# Patient Record
Sex: FEMALE | Race: WHITE | NOT HISPANIC OR LATINO | URBAN - METROPOLITAN AREA
[De-identification: names, ages, dates, MRNs, and addresses within clinical notes are randomized per-mention and may not be internally consistent; named-entity substitution may affect disease eponyms.]

---

## 2023-04-26 ENCOUNTER — HOSPITAL ENCOUNTER (OUTPATIENT)
Dept: DATA CONVERSION | Facility: HOSPITAL | Age: 48
End: 2023-04-26
Attending: SURGERY | Admitting: SURGERY
Payer: COMMERCIAL

## 2023-04-26 DIAGNOSIS — D24.2 BENIGN NEOPLASM OF LEFT BREAST: ICD-10-CM

## 2023-04-26 DIAGNOSIS — Z87.891 PERSONAL HISTORY OF NICOTINE DEPENDENCE: ICD-10-CM

## 2023-04-26 DIAGNOSIS — N60.82 OTHER BENIGN MAMMARY DYSPLASIAS OF LEFT BREAST: ICD-10-CM

## 2023-04-26 DIAGNOSIS — N64.89 OTHER SPECIFIED DISORDERS OF BREAST: ICD-10-CM

## 2023-04-26 DIAGNOSIS — N60.81 OTHER BENIGN MAMMARY DYSPLASIAS OF RIGHT BREAST: ICD-10-CM

## 2023-04-26 DIAGNOSIS — R92.1 MAMMOGRAPHIC CALCIFICATION FOUND ON DIAGNOSTIC IMAGING OF BREAST: ICD-10-CM

## 2023-04-26 DIAGNOSIS — Z88.0 ALLERGY STATUS TO PENICILLIN: ICD-10-CM

## 2023-05-05 LAB
COMPLETE PATHOLOGY REPORT: NORMAL
CONVERTED CLINICAL DIAGNOSIS-HISTORY: NORMAL
CONVERTED FINAL DIAGNOSIS: NORMAL
CONVERTED FINAL REPORT PDF LINK TO COPY AND PASTE: NORMAL
CONVERTED GROSS DESCRIPTION: NORMAL

## 2023-09-07 VITALS — BODY MASS INDEX: 21.98 KG/M2 | HEIGHT: 64 IN | WEIGHT: 128.75 LBS

## 2023-10-02 NOTE — OP NOTE
PROCEDURE DETAILS    Preoperative Diagnosis:  Left breast atypical intraductal proliferation     Postoperative Diagnosis:  Left breast atypical intraductal proliferation     Surgeon: Allyson Hernandez  Resident/Fellow/Other Assistant: Antwon Chacon    Procedure:  1. Left Breast Magseed Localized Excisional Biopsy  2.  Left breast local tissue rearrangement    Anesthesia: Jayson Becerra  Estimated Blood Loss: 5 cc  Findings: left breast tissue with magseed and calcifications in tissue on xray  Specimens(s) Collected: yes,  left breast tissue   Patient Returned To/Condition: PACU/stable         Operative Report:   INDICATIONS FOR PROCEDURE:    Naheed Mcgee is a 48-year-old female who presented with screen detected calcifications in her left breast for which stereotactic core needle biopsy yielded atypical intraductal proliferation  with calcifications and focal necrosis; the latter was suggestive of a more significant adjacent lesion. We met in surgical consultation, and I personally reviewed her imaging and pathology.  I recommended left breast Magseed localized wide local excisional  biopsy. Following a detailed discussion regarding risks, benefits, and alternatives, informed consent was obtained, and we agreed to proceed. Prior to her procedure today she had a magnetic seed (Magseed) placed in the previously biopsied left breast  for localization purposes.  My personal review of her postprocedure films demonstrated the Magseed to be in good position relative to the residual calcifications. The biopsy clip had been dislodged at the time of core needle biopsy.      DESCRIPTION OF PROCEDURE:    The patient was brought to the operating room and positioned supine on the OR table.  Following patient identification and a time-out procedure, SCDs were applied, and antibiotics  were administered.  General anesthesia was initiated.  I utilized the Sentimag probe to confirm the placement of the Magseed within the left  breast.  The operative field was prepped and draped in a standard sterile fashion.     The calcifications were located in the lower outer quadrant at mid depth.  A curvilinear incision was chosen to hide the surgical scar in a cosmetic location. 1% lidocaine was injected  subcutaneously.  The skin was incised sharply.  Dissection was carried down through the skin and subcutaneous tissues. Soft tissue flaps were then raised in all directions: superior, inferior, medial, and lateral.  The lesion was located superior and  lateral to our incision; therefore, a tunneling technique was used.  I identified the anterior mastectomy plane between the subcutaneous tissues and the anterior breast parenchyma. I then developed a mastectomy flap widely up to and beyond the lesion.  The Sentimag probe was used to identify the Magseed in the area targeted for excision, and a marking stitch was placed within it for dissection purposes.  This area was then widely and circumferentially dissected from the surrounding tissues using electrocautery.  Care and attention were taken to include tissue superior and lateral to the Magseed to excise an additional small grouping of calcifications. The specimen was excised and labeled as left breast tissue.  It was marked with a double short stitch superior,  double long stitch lateral, and a single stitch anterior. It was placed on a grid and sent to [facility] for an x-ray which demonstrated the calcifications and Magseed within the specimen on x-ray.  I personally interpreted these images intraoperatively.   The specimen was then sent to Pathology for permanent section. I placed surgical clips at the posterior, superior, and lateral margins of the excision cavity. The excision cavity was copiously irrigated with warm sterile saline solution.  Hemostasis  was achieved.    The specimen measured 3.5 cm x 2.0 cm x 3.0 cm yielding a soft tissue defect of approximately 22 sq cm; therefore,  local tissue rearrangement was performed. Surrounding breast parenchyma was mobilized circumferentially from the anterior mastectomy plane  anteriorly and posteriorly from within the breast parenchyma. Once mobilized, the superior-medial and inferior-lateral pillars were advanced centrally and secured to each other with a 3-0 Vicryl suture in an interrupted fashion to close the defect. Hemostasis  was reconfirmed.  The more superficial layer of tissue that had been mobilized at the beginning of the procedure was similarly closed in order to create a cosmetic effect. 0.5% Marcaine was injected subcutaneously for analgesia.  The incision was then  closed in layers with an interrupted 3-0 Vicryl in the deep dermis followed by a running subcuticular 4-0 Monocryl for the skin.  Skin glue, fluffs, and a surgical bra were then applied.      The patient tolerated the procedure well.  There were no immediate complications.  All counts were correct.  Estimated blood loss was 5 cc.  I was present for and performed the entire procedure. The patient was then awakened and transported to the PACU  in stable condition.     Allyson Hernandez MD  Breast Surgical Oncology   pager [phone]    Note Recipients:   Susie Hernandez, DO - [id number] []                        Attestation:   Note Completion:  Attending Attestation I performed the procedure without a resident         Electronic Signatures:  Allyson Hernandez)  (Signed 26-Apr-2023 16:17)   Authored: Post-Operative Note, Chart Review, Note Completion      Last Updated: 26-Apr-2023 16:17 by Allyson Hernandez)

## 2023-10-05 ENCOUNTER — LAB (OUTPATIENT)
Dept: LAB | Facility: LAB | Age: 48
End: 2023-10-05
Payer: COMMERCIAL

## 2023-10-05 DIAGNOSIS — Z00.00 ENCOUNTER FOR GENERAL ADULT MEDICAL EXAMINATION WITHOUT ABNORMAL FINDINGS: Primary | ICD-10-CM

## 2023-10-05 LAB
ESTRADIOL SERPL-MCNC: <19 PG/ML
FSH SERPL-ACNC: 107.4 IU/L
LH SERPL-ACNC: 44.9 IU/L

## 2023-10-05 PROCEDURE — 83002 ASSAY OF GONADOTROPIN (LH): CPT

## 2023-10-05 PROCEDURE — 83001 ASSAY OF GONADOTROPIN (FSH): CPT

## 2023-10-05 PROCEDURE — 82670 ASSAY OF TOTAL ESTRADIOL: CPT

## 2023-10-05 PROCEDURE — 36415 COLL VENOUS BLD VENIPUNCTURE: CPT

## 2023-10-09 ENCOUNTER — TELEPHONE (OUTPATIENT)
Dept: SURGERY | Facility: HOSPITAL | Age: 48
End: 2023-10-09
Payer: COMMERCIAL

## 2023-10-09 DIAGNOSIS — Z12.39 BREAST CANCER SCREENING, HIGH RISK PATIENT: Primary | ICD-10-CM

## 2023-10-09 RX ORDER — TAMOXIFEN CITRATE 10 MG/1
10 TABLET ORAL DAILY
Qty: 90 TABLET | Refills: 3 | Status: SHIPPED | OUTPATIENT
Start: 2023-10-09 | End: 2023-12-19

## 2023-10-09 NOTE — TELEPHONE ENCOUNTER
Spoke with Marylu regarding lab results. Estradiol/FSH/LH demonstrates menopausal status. She has not had a cycle and discontinued her OCP 5 weeks ago. She is in agreement to start Tamoxifen 10mg daily for three years for risk reduction. Prescription sent to [facility]. Encouraged her to discuss Bupropion dosage with ordering provider to try and take the medications (Tamoxifen/Bupropion) 12 hours apart if possible. She is aware of risks and side effects with Tamoxifen. She does not need a form of contraception as she is menopausal. She will [facility] to [facility] in [facility] 2024 for annual mammogram and exam as previously recommended.

## 2023-10-16 DIAGNOSIS — Z12.39 BREAST CANCER SCREENING, HIGH RISK PATIENT: ICD-10-CM

## 2023-10-18 ENCOUNTER — APPOINTMENT (OUTPATIENT)
Dept: OBSTETRICS AND GYNECOLOGY | Facility: CLINIC | Age: 48
End: 2023-10-18
Payer: COMMERCIAL

## 2023-10-20 RX ORDER — BUPROPION HYDROCHLORIDE 150 MG/1
150 TABLET, EXTENDED RELEASE ORAL 2 TIMES DAILY
COMMUNITY
Start: 2023-08-01

## 2023-10-20 RX ORDER — NORGESTIMATE AND ETHINYL ESTRADIOL 7DAYSX3 28
1 KIT ORAL DAILY
COMMUNITY
Start: 2019-01-28 | End: 2024-01-25 | Stop reason: ALTCHOICE

## 2023-11-06 ENCOUNTER — DOCUMENTATION (OUTPATIENT)
Dept: SURGERY | Facility: HOSPITAL | Age: 48
End: 2023-11-06
Payer: COMMERCIAL

## 2023-11-06 NOTE — PROGRESS NOTES
Spoke with Naheed today. She started Tamoxifen 10mg daily for risk reduction on 10/29/2023. On Thursday 11/2/2023 she had what she felt like was a panic attack. She states she is feeling better now and it hasn't happened since. She does report daily hot flashes since starting Tamoxifen, but tolerable. We agreed to continue Tamoxifen and she will contact office if symptoms worsen.

## 2023-11-09 NOTE — PROGRESS NOTES
Naheed Mcgee female   1975 48 y.o.   [id number]      Chief Complaint  Right breast mass    History Of Present Illness  Naheed (''Marylu'') Everardo is a very pleasant 48 year old  female who is status post left breast Magseed localized excisional biopsy on 2023 for screen detected left breast atypical intraductal proliferation. Final pathology yielded atypical ductal hyperplasia (ADH) and atypical lobular hyperplasia (ALH), radial scar and PASH. She has a remote history of two right breast biopsies in 2019, benign. She has family history of breast cancer in her maternal cousin, 60. She has family history of ovarian cancer in her maternal grandmother, age 40. She presents today for a new right breast lump. She is here with her , Sulaiman. She first noticed it on 2023.      FEMALE HISTORY: menarche age 8, , first birth age 29, did not breastfeed, OCP's x 30 years (currently on OCP daily), premenopausal, LMP 3/2023, cycles becoming irregular, heterogeneously dense tissue     FAMILY CANCER HISTORY:  Maternal Cousin: Breast cancer, 60, BRCA 2 positive  Maternal Grandmother: Ovarian cancer, 40  Father: Bladder cancer, 60      Surgical History  She has a past surgical history that includes Other surgical history (2018);  section, classic (2019); and BI mammo guided breast left localization (Left, 3/30/2023).     Social History  She has no history on file for tobacco use, alcohol use, and drug use.    Family History  No family history on file.     Allergies  Patient has no allergy information on record.    Medications  Current Outpatient Medications   Medication Instructions    buPROPion SR (WELLBUTRIN SR) 150 mg, oral, 2 times daily    norgestimate-ethinyl estradioL (Ortho Tri-Cyclen,Trinessa) 0.18/0.215/0.25 mg-35 mcg (28) tablet 1 tablet, oral, Daily    tamoxifen (NOLVADEX) 10 mg, oral, Daily, Take with water or any other nonalcoholic drink with or without food at around  the same time(s) every day.         REVIEW OF SYSTEMS    Constitutional:  Negative for appetite change, fatigue, fever and unexpected weight change.   HENT:  Negative for ear pain, hearing loss, nosebleeds, sore throat and trouble swallowing.    Eyes:  Negative for discharge, itching and visual disturbance.   Respiratory:  Negative for cough, chest tightness and shortness of breath.    Cardiovascular:  Negative for chest pain, palpitations and leg swelling.   Breast: as indicated in HPI  Gastrointestinal:  Negative for abdominal pain, constipation, diarrhea and nausea.   Endocrine: Negative for cold intolerance and heat intolerance.   Genitourinary:  Negative for dysuria, frequency, hematuria, pelvic pain and vaginal bleeding.   Musculoskeletal:  Negative for arthralgias, back pain, gait problem, joint swelling and myalgias.   Skin:  Negative for color change and rash.   Allergic/Immunologic: Negative for environmental allergies and food allergies.   Neurological:  Negative for dizziness, tremors, speech difficulty, weakness, numbness and headaches.   Hematological:  Does not bruise/bleed easily.   Psychiatric/Behavioral:  Negative for agitation, dysphoric mood and sleep disturbance. The patient is not nervous/anxious.         Past Medical History  She has a past medical history of Encounter for screening mammogram for malignant neoplasm of breast (08/26/2020).     Physical Exam  Patient is alert and oriented x3 and in a relaxed and appropriate mood. Her gait is steady and hand grasps are equal. Sclera is clear. The breasts are small and nearly symmetrical. The tissue is dense without palpable abnormalities, discrete nodules or masses. The left breast has a well-healed excisional biopsy incision, inferior lateral quadrant. The skin and nipples appear normal. The right nipple is inverted. There is no cervical, supraclavicular or axillary lymphadenopathy. Heart rate and rhythm normal, S1 and S2 appreciated. The lungs  are clear to auscultation bilaterally. Abdomen is soft and non-tender.       Physical Exam     Last Recorded Vitals  There were no vitals filed for this visit.      Imaging  MR breast bilateral w contrast full protocol 07/12/2023    Narrative  Interpreted By:  NEHA MOSQUEDA MD  MRN: [id number]  Patient Name: TERRY SEGOVIA    STUDY:  MRI BREAST BILATERAL WITH CONTRAST FULL PROTOCOL;  7/12/2023 1:10 pm    ACCESSION NUMBER(S):  [id number]    ORDERING CLINICIAN:  KIM MCLAIN    INDICATION:  High risk screening. Dense breast tissue. Left breast excisional  biopsy of atypia.    COMPARISON:  Mammogram 03/20/2023    TECHNIQUE:  Using a dedicated breast coil, STIR axial and T1-weighted fat  saturation axial images of the breasts were obtained, the latter both  before and after intravenous administration of Gadolinium DTPA. On an  independent workstation, 3-D images were formulated using [employer]  including time enhancement curves, subtraction images and MIP images.    Intravenous contrast:  12 milliliter of DOTAREM GADOTERATE MEGLUMINE  INJECTION    FINDINGS:  There is  symmetric  marked bilateral background enhancement,  limiting the evaluation.    There is extreme fibroglandular tissue. Bilateral cysts are present.    RIGHT BREAST:  No suspicious mass or nonmass enhancement is identified.    No axillary or internal mammary lymphadenopathy is appreciated.    LEFT BREAST:  No suspicious mass or nonmass enhancement is identified. Minimal  postsurgical changes are seen in the inferior central to lateral  aspect at posterior depth.    No axillary or internal mammary lymphadenopathy is appreciated.    NON-BREAST FINDINGS:  None. Please note that the evaluation of the cervical, thoracic and  abdominal structures is limited on the dedicated breast MRI  examination.    Impression  No MRI evidence of malignancy in either breast.    BI-RADS CATEGORY:    Category: 2 - Benign.  Recommendation: 1 Year Screening.        Assessment/Plan   High risk surveillance care, normal clinical exam, history of left ADH/ALH, history of right core biopsy, benign, family history of breast and ovarian cancer, heterogeneously dense tissue     Plan: Return in January 2024 for bilateral screening mammogram and office visit. Continue Tamoxifen 10 mg daily.     Patient Discussion/Summary  Your clinical examination and imaging are normal. Please return in January 2024 for bilateral screening mammogram and office visit or sooner if you have any problems or concerns.     You can see your health information, review clinical summaries from office visits & test results online when you follow your health with MY  Chart, a personal health record. To sign up go to [email or web address]. If you need assistance with signing up or trouble getting into your account call [email or web address] Patient Line 24/7 at [phone].    My office phone number is [phone] if you need to get in touch with me or have additional questions or concerns. Thank you for choosing [facility] and trusting me as your healthcare provider. I look forward to seeing you again at your next office visit. I am honored to be a provider on your health care team and I remain dedicated to helping you achieve your health goals.      Rylee Cary, APRN-CNP

## 2023-11-14 ENCOUNTER — APPOINTMENT (OUTPATIENT)
Dept: SURGICAL ONCOLOGY | Facility: HOSPITAL | Age: 48
End: 2023-11-14
Payer: COMMERCIAL

## 2023-11-14 PROBLEM — R92.8 ABNORMAL MAMMOGRAM OF RIGHT BREAST: Status: ACTIVE | Noted: 2023-11-14

## 2023-11-14 PROBLEM — Z79.810 USE OF TAMOXIFEN (NOLVADEX): Status: ACTIVE | Noted: 2023-11-14

## 2023-11-14 PROBLEM — R35.0 URINARY FREQUENCY: Status: ACTIVE | Noted: 2023-11-14

## 2023-11-14 PROBLEM — N63.10 MASS OF RIGHT BREAST: Status: ACTIVE | Noted: 2023-11-14

## 2023-11-14 PROBLEM — M62.89 PELVIC FLOOR DYSFUNCTION: Status: ACTIVE | Noted: 2023-11-14

## 2023-11-14 PROBLEM — N32.81 OAB (OVERACTIVE BLADDER): Status: ACTIVE | Noted: 2023-08-01

## 2023-11-14 PROBLEM — F43.23 SITUATIONAL MIXED ANXIETY AND DEPRESSIVE DISORDER: Status: ACTIVE | Noted: 2023-08-01

## 2023-11-14 PROBLEM — Z12.39 BREAST CANCER SCREENING, HIGH RISK PATIENT: Status: ACTIVE | Noted: 2023-11-14

## 2023-11-14 PROBLEM — R92.1 BREAST CALCIFICATIONS ON MAMMOGRAM: Status: ACTIVE | Noted: 2023-11-14

## 2023-11-14 RX ORDER — NITROFURANTOIN 25; 75 MG/1; MG/1
100 CAPSULE ORAL EVERY 12 HOURS
COMMUNITY
Start: 2023-06-26 | End: 2023-12-05 | Stop reason: SDUPTHER

## 2023-11-14 NOTE — PROGRESS NOTES
Naheed Mcgee female   1975 48 y.o.   [id number]     Chief Complaint  Follow up right breast mass.     History Of Present Illness  Naheed (''Marylu'') Everardo is a very pleasant 48 year old  female who is status post left breast Magseed localized excisional biopsy on 2023 for screen detected left breast atypical intraductal proliferation. Final pathology yielded atypical ductal hyperplasia (ADH) and atypical lobular hyperplasia (ALH), radial scar and PASH. She has a remote history of two right breast biopsies in 2019, benign. She has family history of breast cancer in her maternal cousin, 60. She has family history of ovarian cancer in her maternal grandmother, age 40. She met with genetics and was BRCA negative. She started Tamoxifen 10mg daily for risk reduction on 10/29/2023. On 2023 she had what she felt like was a panic attack. She states she is feeling better now and it hasn't happened since. She does report daily hot flashes since starting Tamoxifen, but tolerable. She was agreeable to continue Tamoxifen. She presents today for a new right breast mass. She first noticed the lump. She denies trauma to the breast.     11/15/2023 Right diagnostic mammogram with ultrasound, indicates BI-RADS Category 2.      REPRODUCTIVE HISTORY: menarche age 8, , first birth age 29, did not breastfeed, OCP's x 30 years (currently on OCP daily), premenopausal, LMP cycles becoming irregular, heterogeneously dense tissue     FAMILY CANCER HISTORY:  Maternal Cousin: Breast cancer, 60, BRCA 2 positive  Maternal Grandmother: Ovarian cancer, 40  Father: Bladder cancer, 60      Review of Systems  Constitutional:  Negative for appetite change, fatigue, fever and unexpected weight change.   HENT:  Negative for ear pain, hearing loss, nosebleeds, sore throat and trouble swallowing.    Eyes:  Negative for discharge, itching and visual disturbance.   Breast: As stated in HPI.  Respiratory:  Negative for  cough, chest tightness and shortness of breath.    Cardiovascular:  Negative for chest pain, palpitations and leg swelling.   Gastrointestinal:  Negative for abdominal pain, constipation, diarrhea and nausea.   Endocrine: Negative for cold intolerance and heat intolerance.   Genitourinary:  Negative for dysuria, frequency, hematuria, pelvic pain and vaginal bleeding.   Musculoskeletal:  Negative for arthralgias, back pain, gait problem, joint swelling and myalgias.   Skin:  Negative for color change and rash.   Allergic/Immunologic: Negative for environmental allergies and food allergies.   Neurological:  Negative for dizziness, tremors, speech difficulty, weakness, numbness and headaches.   Hematological:  Does not bruise/bleed easily.   Psychiatric/Behavioral:  Negative for agitation, dysphoric mood and sleep disturbance. The patient is not nervous/anxious.       Past Medical History  She has a past medical history of Encounter for screening mammogram for malignant neoplasm of breast (2020).     Surgical History  She has a past surgical history that includes Other surgical history (2018);  section, classic (2019); and BI mammo guided breast left localization (Left, 3/30/2023).     Family History  Cancer-related family history is not on file.     Social History     Tobacco Use    Smoking status: Former     Years: 5     Types: Cigarettes     Quit date:      Years since quittin.8    Smokeless tobacco: Never   Substance Use Topics    Alcohol use: Not Currently         Allergies  Allergies   Allergen Reactions    Penicillins Hives        Medications       Current Outpatient Medications   Medication Instructions    buPROPion SR (WELLBUTRIN SR) 150 mg, oral, 2 times daily    norgestimate-ethinyl estradioL (Ortho Tri-Cyclen,Trinessa) 0.18/0.215/0.25 mg-35 mcg (28) tablet 1 tablet, oral, Daily    tamoxifen (NOLVADEX) 10 mg, oral, Daily, Take with water or any other nonalcoholic drink with  "or without food at around the same time(s) every day.         Last Recorded Vitals  Blood pressure 113/79, pulse 59, height 1.626 m (5' 4\"), weight 62.7 kg (138 lb 3.2 oz).       Physical Exam  Chest:          Patient is alert and oriented x3 and in a relaxed and appropriate mood. Her gait is steady and hand grasps are equal. Sclera is clear. The breasts are small and nearly symmetrical. Right breast 7:00, 3 cm from the nipple is a 1 x 1 cm mass, soft, round, and mobile. The left breast tissue is dense without palpable abnormalities, discrete nodules or masses. The left breast has a well-healed excisional biopsy incision, inferior lateral quadrant. The skin and nipples appear normal. The right nipple is inverted. There is no cervical, supraclavicular or axillary lymphadenopathy. Heart rate and rhythm normal, S1 and S2 appreciated. The lungs are clear to auscultation bilaterally. Abdomen is soft and non-tender.        Relevant Results and Imaging  Study Result    Narrative & Impression   Interpreted By:  Topher Hicks,   STUDY:  BI MAMMO RIGHT DIAGNOSTIC TOMOSYNTHESIS; BI US BREAST LIMITED RIGHT;  11/15/2023 12:20 pm; 11/15/2023 12:47 pm      ACCESSION NUMBER(S):  [id number]; [id number]      ORDERING CLINICIAN:  KIM MCLAIN      INDICATION:  Diagnostic evaluation of palpable area of clinical concern in the  right breast. History of left breast focal atypical ductal  hyperplasia, ALH associated with radial scar status post excisional  biopsy (April 2023).      COMPARISON:  02/10/2023, 01/23/2023, breast MRI 07/12/2023 and all prior breast  imaging exams available in our system at the time of dictation.      FINDINGS:  MAMMOGRAPHY: 2D and tomosynthesis images were reviewed at 1 mm slice  thickness. Additional spot magnification views were obtained.      Density:  The breast tissue is heterogeneously dense, which may  obscure small masses.      A radiopaque marker was placed by the patient on the skin at the " site  of the palpable clinical concern in the central lower right breast at  anterior depth. There is an oval low-density mass with circumscribed  margins underneath the BB marker in the right breast.      Calcifications in subareolar region of the right breast associated  with a biopsy clip demonstrate layering on magnification views  consistent with benign changes. No suspicious masses or  calcifications are identified. There is an additional biopsy tissue  marker in the central outer right breast at anterior depth.      ULTRASOUND: Targeted ultrasound with elastography performed by a  registered sonographer in the area of palpable clinical concern in  the right breast at 7 o'clock, 3 cm from the nipple demonstrates a  complicated cyst with debris measuring 0.7 x 0.5 x 0.6 cm. This  finding is avascular and soft on elastography. No suspicious  sonographic abnormality is identified.      IMPRESSION:  1. Benign cyst in the right breast corresponds to the palpable area  of clinical concern. Clinical follow-up is recommended.  2. No mammographic or targeted sonographic evidence of malignancy in  the right breast. Patient will be due for annual bilateral screening  mammography in February 2024.      BI-RADS CATEGORY:      BI-RADS Category:  2 Benign.  Recommendation:  Return to Annual Screening Mammogram.  Recommended Date:  N/A.  Laterality:  Bilateral.      For any future breast imaging appointments, please call [phone]  ([phone]).          MACRO:  None      Signed by: Topher Hicks 11/15/2023 3:48 PM        Time was spent viewing digital images. I explained the results in depth, along with suggested explanation for follow up recommendations based on the testing results. BI-RADS Category 2        Visit Diagnosis  1. Mass of right breast, unspecified quadrant        2. Use of tamoxifen (Nolvadex)        3. Breast cancer screening, high risk patient             Assessment/Plan  High risk surveillance care, right  breast mass, history of left ADH/ALH, history of right core biopsy, benign, BRCA negative, on Tamoxifen, family history of breast and ovarian cancer, heterogeneously dense tissue     Plan:  Return in January 2024 for bilateral screening mammogram and office visit.      Patient Discussion/Summary  Return in January 2024 for bilateral screening mammogram and office visit.      You can see your health information, review clinical summaries from office visits & test results online when you follow your health with MY  Chart, a personal health record. To sign up go to [email or web address]. If you need assistance with signing up or trouble getting into your account call [employer] Patient Line 24/7 at [phone].     My office phone number is [phone] if you need to get in touch with me or have additional questions or concerns. Thank you for choosing [facility] and trusting me as your healthcare provider. I look forward to seeing you again at your next office visit. I am honored to be a provider on your health care team and I remain dedicated to helping you achieve your health goals.     Bette España, APRN-CNP

## 2023-11-15 ENCOUNTER — OFFICE VISIT (OUTPATIENT)
Dept: SURGICAL ONCOLOGY | Facility: CLINIC | Age: 48
End: 2023-11-15
Payer: COMMERCIAL

## 2023-11-15 ENCOUNTER — ANCILLARY PROCEDURE (OUTPATIENT)
Dept: RADIOLOGY | Facility: CLINIC | Age: 48
End: 2023-11-15
Payer: COMMERCIAL

## 2023-11-15 VITALS
WEIGHT: 138.2 LBS | BODY MASS INDEX: 23.6 KG/M2 | SYSTOLIC BLOOD PRESSURE: 113 MMHG | HEIGHT: 64 IN | DIASTOLIC BLOOD PRESSURE: 79 MMHG | HEART RATE: 59 BPM

## 2023-11-15 DIAGNOSIS — N63.0 BREAST LUMP IN FEMALE: ICD-10-CM

## 2023-11-15 DIAGNOSIS — N63.10 MASS OF RIGHT BREAST, UNSPECIFIED QUADRANT: Primary | ICD-10-CM

## 2023-11-15 DIAGNOSIS — Z12.39 BREAST CANCER SCREENING, HIGH RISK PATIENT: ICD-10-CM

## 2023-11-15 DIAGNOSIS — Z79.810 USE OF TAMOXIFEN (NOLVADEX): ICD-10-CM

## 2023-11-15 PROCEDURE — 76642 ULTRASOUND BREAST LIMITED: CPT | Mod: RIGHT SIDE | Performed by: STUDENT IN AN ORGANIZED HEALTH CARE EDUCATION/TRAINING PROGRAM

## 2023-11-15 PROCEDURE — 99213 OFFICE O/P EST LOW 20 MIN: CPT | Mod: 25 | Performed by: NURSE PRACTITIONER

## 2023-11-15 PROCEDURE — 77061 BREAST TOMOSYNTHESIS UNI: CPT | Mod: RT

## 2023-11-15 PROCEDURE — 99213 OFFICE O/P EST LOW 20 MIN: CPT | Performed by: NURSE PRACTITIONER

## 2023-11-15 PROCEDURE — 77065 DX MAMMO INCL CAD UNI: CPT | Mod: RIGHT SIDE | Performed by: STUDENT IN AN ORGANIZED HEALTH CARE EDUCATION/TRAINING PROGRAM

## 2023-11-15 PROCEDURE — 77061 BREAST TOMOSYNTHESIS UNI: CPT | Mod: RIGHT SIDE | Performed by: STUDENT IN AN ORGANIZED HEALTH CARE EDUCATION/TRAINING PROGRAM

## 2023-11-15 PROCEDURE — 76642 ULTRASOUND BREAST LIMITED: CPT | Mod: RT

## 2023-11-15 ASSESSMENT — PAIN SCALES - GENERAL: PAINLEVEL: 0-NO PAIN

## 2023-12-05 ENCOUNTER — PROCEDURE VISIT (OUTPATIENT)
Dept: UROLOGY | Facility: CLINIC | Age: 48
End: 2023-12-05
Payer: COMMERCIAL

## 2023-12-05 DIAGNOSIS — R39.15 URGENCY OF URINATION: Primary | ICD-10-CM

## 2023-12-05 DIAGNOSIS — M62.89 HIGH-TONE PELVIC FLOOR DYSFUNCTION: ICD-10-CM

## 2023-12-05 DIAGNOSIS — R35.0 URINARY FREQUENCY: ICD-10-CM

## 2023-12-05 LAB
POC APPEARANCE, URINE: CLEAR
POC BILIRUBIN, URINE: NEGATIVE
POC BLOOD, URINE: ABNORMAL
POC COLOR, URINE: YELLOW
POC GLUCOSE, URINE: NEGATIVE MG/DL
POC KETONES, URINE: NEGATIVE MG/DL
POC LEUKOCYTES, URINE: NEGATIVE
POC NITRITE,URINE: NEGATIVE
POC PH, URINE: 7 PH
POC PROTEIN, URINE: NEGATIVE MG/DL
POC SPECIFIC GRAVITY, URINE: 1.02
POC UROBILINOGEN, URINE: 0.2 EU/DL

## 2023-12-05 PROCEDURE — 2500000004 HC RX 250 GENERAL PHARMACY W/ HCPCS (ALT 636 FOR OP/ED): Mod: JZ | Performed by: OBSTETRICS & GYNECOLOGY

## 2023-12-05 PROCEDURE — 99213 OFFICE O/P EST LOW 20 MIN: CPT | Performed by: OBSTETRICS & GYNECOLOGY

## 2023-12-05 PROCEDURE — 52287 CYSTOSCOPY CHEMODENERVATION: CPT | Performed by: OBSTETRICS & GYNECOLOGY

## 2023-12-05 RX ORDER — NITROFURANTOIN 25; 75 MG/1; MG/1
100 CAPSULE ORAL EVERY 12 HOURS
Qty: 6 CAPSULE | Refills: 0 | Status: SHIPPED | OUTPATIENT
Start: 2023-12-05 | End: 2023-12-06 | Stop reason: SDUPTHER

## 2023-12-05 RX ORDER — NITROFURANTOIN 25; 75 MG/1; MG/1
100 CAPSULE ORAL ONCE
Status: COMPLETED | OUTPATIENT
Start: 2023-12-05 | End: 2023-12-05

## 2023-12-05 RX ADMIN — ONABOTULINUMTOXINA 100 UNITS: 100 INJECTION, POWDER, LYOPHILIZED, FOR SOLUTION INTRAMUSCULAR at 15:31

## 2023-12-05 RX ADMIN — NITROFURANTOIN (MONOHYDRATE/MACROCRYSTALS) 100 MG: 75; 25 CAPSULE ORAL at 15:31

## 2023-12-05 NOTE — PATIENT INSTRUCTIONS
Please  your 3-day course of Macrobid therapy at your local pharmacy.    Please follow-up in 5 to 6 months for repeat 100 units Botox.    Please contact the clinic with any questions or concerns.    [phone]

## 2023-12-05 NOTE — PROGRESS NOTES
Subjective   Patient ID: Naheed Mcgee is a 48 y.o. female who presents for intradetrusor Botox.  HPI  48-year-old with urinary urgency and frequency and high tone pelvic floor weakness having undergone 100 units of Botox 06/26/2023 presenting for repeat 100 units Botox.     She has noted worsening urinary urgency and frequency complaints and believes it is time for repeat 100 units Botox.    She has no other complaints.     From Previous note     48-year-old with urinary urgency and frequency and high tone pelvic floor weakness presenting for 100 units of Botox 06/26/2023.     She reports her Botox injections have helped her greatly. She is happy with her treatment. She notes she felt pressure/cramping 2 days after her injection but has since resolved.     She has no other complaints.      From previous note  48-year-old with urinary urgency and frequency and high tone pelvic floor weakness presenting for 100 units of Botox today 06/26/2023.      She has no other complaints.      From previous note  48- year-old patient presenting as a self referral with complaints of urinary urgency, frequency and incontinence complaints.     The patient presents with complaints or urinary frequency and urgency. She 2-3 notes episodes of nocturia but denies any enuresis. She voids 1-2 times every hour during the day. She has previously tried timed voiding and pelvic floor Physical therapy with no benefits. She has utilized Ditropan in the past with no benefits. She was also prescribed Myrbetriq and Gemtesa which have not benefited her lower urinary tract complaints. She denies leaking on laughing, cough or sneezing. She denies any history of nephrolithiasis, gross hematuria or chronic recurrent UTIs.      She underwent urodynamic testing 9/18/2020 demonstrating a hypersensitive bladder with urgency and no other concerns.     She is sexually active but denies any vaginal complaints, no abnormal vaginal bleeding or discharge. She  denies any vaginal dryness or irritation. She denies any bulge complaints, no pressure or pulling.     She denies any bowel related complaints, no fecal or flatal incontinence.     She has no other complaints.          Review of Systems  Constitutional: No fever, No chills and No fatigue.   Eyes: No vision problems and No dryness of the eyes.   ENT: No dry mouth, No hearing loss and No nosebleeds.   Cardiovascular: No chest pain, No palpitations and No orthopnea.   Respiratory: No shortness of breath, No cough and No wheezing.   Gastrointestinal: No abdominal pain, No constipation, No nausea, No diarrhea, No vomiting and No melena.   Genitourinary: As noted in HPI.   Musculoskeletal: No back pain, No myalgias, No muscle weakness, No joint swelling and No leg edema.   Integumentary: No rashes, No skin lesion and No itching.   Neurological: No headache, No numbness and No dizziness.   Psychiatric: No sleep disturbances, No anxiety and No depression.   Endocrine: No hot flashes, No loss of hair and No hirsutism.   Hematologic/Lymphatic: No swollen glands, No tendency for easy bleeding and No tendency for easy bruising.   All other systems have been reviewed and are negative for complaint.        Objective   Physical Exam    PHYSICAL EXAMINATION:  No LMP recorded. Patient is perimenopausal.  There is no height or weight on file to calculate BMI.  There were no vitals taken for this visit.  General Appearance: well appearing  Neuro: Alert and oriented   HEENT: mucous membranes moist, neck supple  Resp: No respiratory distress, normal work of breathing  MSK: normal range of motion, gait appropriate      After consent was signed and placed in the chart the patient was prepped with iodine and lidocaine.  A flexible cystoscope was then introduced into the bladder with normal-appearing bladder mucosa and ureteral orifices in the normal orthotopic position.  She was then injected with in 5 locations with an Olympus needle to  a depth of 4 mm with 2 cc aliquots of a mixture of 100 units Botox mixed into 10 cc of preservative-free saline.  The bladder was then drained.  There was no bleeding.  The patient received 100 mg of Macrobid at the completion of the case.  She tolerated the procedure well.    Assessment/Plan   48-year-old with urinary urgency and frequency and high tone pelvic floor weakness having undergone 100 units of Botox 06/26/2023 and today 12/5/2023.     #1 uncomplicated 100 units Botox today 12/5/2023.  The patient has previously utilized Ditropan as well as Myrbetriq and Gemtesa with no significant benefits to her lower urinary tract symptoms. Urodynamics performed in September 2020 demonstrated hypersensitivity with no other bladder abnormalities and no genuine stress urinary incontinence. We have previously discussed at length her third line therapeutic options including PTNS, Botox, and InterStim. She has had excellent benefits with her 100 units Botox and we will continue this moving forward.  The patient will receive a 3-day course of Macrobid therapy now.     2. The patient was noted to have a high tone but weak pelvic floor. We will readdress the need for pelvic floor physical therapy follow-up visits.     3. The patient will follow-up for repeat 100 units Botox in April 2024.     JASON Kong MD   #1 the patient has previously utilized Ditropan as well as Myrbetriq and Gemtesa with no significant benefits to her lower urinary tract symptoms. Urodynamics performed in September 2020 demonstrated hypersensitivity with no other bladder abnormalities and no genuine stress urinary incontinence. We have previously discussed at length her third line therapeutic options including PTNS, Botox, and InterStim. She has had excellent benefits with her 100 units Botox and we will continue this moving forward.     2. The patient was noted to have a high tone but weak pelvic floor. We will readdress the need for pelvic  floor physical therapy follow-up visits.     3. The patient will follow-up for repeat 100 units Botox when she feels her urgency and frequency complaints return. She will contact the clinic when she is ready for this.     JASON Kong MD     Scribe Attestation  By signing my name below, IDeneen Scribe attest that this documentation has been prepared under the direction and in the presence of Nishant Kong MD. All medical record entries made by the Scribe were at my direction or personally dictated by me. I have reviewed the chart and agree that the record accurately reflects my personal performance of the history, physical exam, discussion and plan.

## 2023-12-06 DIAGNOSIS — R39.15 URGENCY OF URINATION: ICD-10-CM

## 2023-12-06 DIAGNOSIS — R35.0 URINARY FREQUENCY: ICD-10-CM

## 2023-12-06 RX ORDER — NITROFURANTOIN 25; 75 MG/1; MG/1
100 CAPSULE ORAL EVERY 12 HOURS
Qty: 6 CAPSULE | Refills: 0 | Status: SHIPPED | OUTPATIENT
Start: 2023-12-06 | End: 2023-12-09

## 2023-12-07 RX ORDER — DOXYCYCLINE HYCLATE 100 MG
TABLET ORAL
COMMUNITY
Start: 2023-10-20 | End: 2024-04-24 | Stop reason: WASHOUT

## 2023-12-19 RX ORDER — TAMOXIFEN CITRATE 10 MG/1
TABLET ORAL
Qty: 90 TABLET | Refills: 3 | Status: SHIPPED | OUTPATIENT
Start: 2023-12-19

## 2023-12-28 ENCOUNTER — APPOINTMENT (OUTPATIENT)
Dept: RADIOLOGY | Facility: CLINIC | Age: 48
End: 2023-12-28
Payer: COMMERCIAL

## 2023-12-28 ENCOUNTER — APPOINTMENT (OUTPATIENT)
Dept: SURGICAL ONCOLOGY | Facility: CLINIC | Age: 48
End: 2023-12-28
Payer: COMMERCIAL

## 2024-01-16 NOTE — PROGRESS NOTES
Naheed Mcgee female   1975 49 y.o.   [id number]      Chief Complaint  High risk surveillance care, annual mammogram and exam    History Of Present Illness  Naheed (''Marylu'') Everardo is a very pleasant 49 year old  female who is status post left breast Magseed localized excisional biopsy on 2023 for screen detected left breast atypical intraductal proliferation. Final pathology yielded atypical ductal hyperplasia (ADH) and atypical lobular hyperplasia (ALH), radial scar and PASH. She has a remote history of two right breast biopsies in 2019, benign. She has family history of breast cancer in her maternal cousin, 60. She has family history of ovarian cancer in her maternal grandmother, age 40. She started Tamoxifen 10mg daily in 2023, currently taking and tolerating well. She presents today for annual mammogram and exam. She denies any new masses or lumps.     BREAST IMAGIN2023 Bilateral Full MRI, indicates BI-RADS Category 2.      FEMALE HISTORY: menarche age 8, , first birth age 29, did not breastfeed, OCP's x 30 years natural menopause age 48, heterogeneously dense tissue     FAMILY CANCER HISTORY:  Maternal Cousin: Breast cancer, 60, BRCA 2 positive  Maternal Grandmother: Ovarian cancer, 40  Father: Bladder cancer, 60     Surgical History  She has a past surgical history that includes Other surgical history (2018);  section, classic (2019); BI mammo guided breast left localization (Left, 2023); and Breast biopsy (Bilateral).     Social History  She reports that she quit smoking about 12 months ago. Her smoking use included cigarettes. She has never used smokeless tobacco. She reports that she does not currently use alcohol. She reports that she does not use drugs.    Family History  Family History   Problem Relation Name Age of Onset    Ovarian cancer Maternal Grandmother      Breast cancer Cousin  60        maternal 1st cousin         Allergies  Penicillins    Medications  Current Outpatient Medications   Medication Instructions    buPROPion SR (WELLBUTRIN SR) 150 mg, oral, 2 times daily    doxycycline (Vibra-Tabs) 100 mg tablet TAKE 1 TABLET (100 MG TOTAL) BY MOUTH TWICE A DAY FOR 5 DAYS NOT FOR PREGNANCY OR LACTATION    tamoxifen (Nolvadex) 10 mg tablet FOR DIRECTIONS ON HOW TO   TAKE THIS MEDICATION, READ THE [employer] MAIL SERVICE  INVOICE/RECEIPT.         REVIEW OF SYSTEMS    Constitutional:  Negative for appetite change, fatigue, fever and unexpected weight change.   HENT:  Negative for ear pain, hearing loss, nosebleeds, sore throat and trouble swallowing.    Eyes:  Negative for discharge, itching and visual disturbance.   Respiratory:  Negative for cough, chest tightness and shortness of breath.    Cardiovascular:  Negative for chest pain, palpitations and leg swelling.   Breast: as indicated in HPI  Gastrointestinal:  Negative for abdominal pain, constipation, diarrhea and nausea.   Endocrine: Negative for cold intolerance and heat intolerance.   Genitourinary:  Negative for dysuria, frequency, hematuria, pelvic pain and vaginal bleeding.   Musculoskeletal:  Negative for arthralgias, back pain, gait problem, joint swelling and myalgias.   Skin:  Negative for color change and rash.   Allergic/Immunologic: Negative for environmental allergies and food allergies.   Neurological:  Negative for dizziness, tremors, speech difficulty, weakness, numbness and headaches.   Hematological:  Does not bruise/bleed easily.   Psychiatric/Behavioral:  Negative for agitation, dysphoric mood and sleep disturbance. The patient is not nervous/anxious.         Past Medical History  She has a past medical history of Encounter for screening mammogram for malignant neoplasm of breast (08/26/2020).     Physical Exam  Patient is alert and oriented x3 and in a relaxed and appropriate mood. Her gait is steady and hand grasps are equal. Sclera is clear. The breasts  are small and nearly symmetrical. The tissue is dense without palpable abnormalities, discrete nodules or masses. The left breast has a well-healed excisional biopsy incision, inferior lateral quadrant with palpable 1.5 cm soft oil cyst at 4:00. The skin and nipples appear normal. The right nipple is inverted. There is no cervical, supraclavicular or axillary lymphadenopathy. Heart rate and rhythm normal, S1 and S2 appreciated. The lungs are clear to auscultation bilaterally. Abdomen is soft and non-tender.       Physical Exam  Chest:              Last Recorded Vitals  Vitals:    01/25/24 1416   BP: 109/75   Pulse: 57       Relevant Results   Time was spent viewing digital images of the radiology testing with the patient. I explained the results in depth, along with suggested explanation for follow up recommendations based on the testing results. BI-RADS Category 2    Imaging  Narrative & Impression   Interpreted By:  Katie Hernandez,   STUDY:  BI MAMMO BILATERAL SCREENING TOMOSYNTHESIS;  1/25/2024 1:56 pm      ACCESSION NUMBER(S):  [id number]      ORDERING CLINICIAN:  KIM MCLAIN      INDICATION:  Screening. History of left breast lumpectomy. Benign right breast  biopsy.      COMPARISON:  01/23/2023, 01/11/2022.      FINDINGS:  2D and tomosynthesis images were reviewed at 1 mm slice thickness.      Density:  The breast tissue is heterogeneously dense, which may  obscure small masses.      Postoperative scarring and surgical clips are seen in the central  lateral left breast at mid to posterior depth. Stable benign  calcifications are seen in the central slightly inferior right  breast. No suspicious masses or calcifications are identified.      IMPRESSION:  No mammographic evidence of malignancy. Posttreatment changes of the  left breast.      BI-RADS CATEGORY:      BI-RADS Category:  2 Benign.  Recommendation:  Routine Screening Mammogram in 1 Year.  Recommended Date:  1 Year.  Laterality:  Bilateral.        MR breast bilateral w contrast full protocol 07/12/2023    Narrative  Interpreted By:  NEHA MOSQUEDA MD  MRN: [id number]  Patient Name: TERRY SEGOVIA    STUDY:  MRI BREAST BILATERAL WITH CONTRAST FULL PROTOCOL;  7/12/2023 1:10 pm    ACCESSION NUMBER(S):  [id number]    ORDERING CLINICIAN:  KIM MCLAIN    INDICATION:  High risk screening. Dense breast tissue. Left breast excisional  biopsy of atypia.    COMPARISON:  Mammogram 03/20/2023    TECHNIQUE:  Using a dedicated breast coil, STIR axial and T1-weighted fat  saturation axial images of the breasts were obtained, the latter both  before and after intravenous administration of Gadolinium DTPA. On an  independent workstation, 3-D images were formulated using In[employer]  including time enhancement curves, subtraction images and MIP images.    Intravenous contrast:  12 milliliter of DOTAREM GADOTERATE MEGLUMINE  INJECTION    FINDINGS:  There is  symmetric  marked bilateral background enhancement,  limiting the evaluation.    There is extreme fibroglandular tissue. Bilateral cysts are present.    RIGHT BREAST:  No suspicious mass or nonmass enhancement is identified.    No axillary or internal mammary lymphadenopathy is appreciated.    LEFT BREAST:  No suspicious mass or nonmass enhancement is identified. Minimal  postsurgical changes are seen in the inferior central to lateral  aspect at posterior depth.    No axillary or internal mammary lymphadenopathy is appreciated.    NON-BREAST FINDINGS:  None. Please note that the evaluation of the cervical, thoracic and  abdominal structures is limited on the dedicated breast MRI  examination.    Impression  No MRI evidence of malignancy in either breast.    BI-RADS CATEGORY:    Category: 2 - Benign.  Recommendation: 1 Year Screening.       Assessment/Plan   High risk surveillance care, normal clinical exam and imaging, history of left ADH/ALH, history of right core biopsy, benign, family history of breast and  ovarian cancer, heterogeneously dense tissue     Plan: Return in one year for bilateral screening mammogram and office visit. Full MRI in July 2024. Continue Tamoxifen 10mg daily.    Patient Discussion/Summary  Your clinical examination and imaging are normal. Please return in one year for bilateral screening mammogram and office visit or sooner if you have any problems or concerns. Continue Tamoxifen 10mg daily.     High risk breast surveillance care plan:  Yearly mammogram with digital breast tomosynthesis  Twice yearly clinical breast examinations  Breast MRI - Full MRI in July 2024  Monthly self breast examinations  Vitamin D3 2000 IU/daily (over the counter)  Exercise 3-4 times per week for 45-60 minutes  Limit alcohol to 3-4 drinks per week   Eat a healthy low-fat diet with lots of fruits and vegetables  Risk models indicate personal risk of breast cancer in the next five years and lifetime (age 90)  Breast Cancer Risk Assessment Tool (Zaynab): 5 year risk 3.5% (average 1.3%) and lifetime risk 27.3% (average 11%)  Junior: 5 year risk 5.2% (average 1.3%) and lifetime risk 32.8% (average 9.4%)     You can see your health information, review clinical summaries from office visits & test results online when you follow your health with MY  Chart, a personal health record. To sign up go to [email or web address]. If you need assistance with signing up or trouble getting into your account call [employer] Patient Line 24/7 at [phone].    My office phone number is [phone] if you need to get in touch with me or have additional questions or concerns. Thank you for choosing [facility] and trusting me as your healthcare provider. I look forward to seeing you again at your next office visit. I am honored to be a provider on your health care team and I remain dedicated to helping you achieve your health goals.      Ryele Cary, APRN-CNP

## 2024-01-25 ENCOUNTER — HOSPITAL ENCOUNTER (OUTPATIENT)
Dept: RADIOLOGY | Facility: CLINIC | Age: 49
Discharge: HOME | End: 2024-01-25
Payer: COMMERCIAL

## 2024-01-25 ENCOUNTER — OFFICE VISIT (OUTPATIENT)
Dept: SURGICAL ONCOLOGY | Facility: CLINIC | Age: 49
End: 2024-01-25
Payer: COMMERCIAL

## 2024-01-25 VITALS — WEIGHT: 138.23 LBS | BODY MASS INDEX: 23.6 KG/M2 | HEIGHT: 64 IN

## 2024-01-25 VITALS
HEART RATE: 57 BPM | DIASTOLIC BLOOD PRESSURE: 75 MMHG | HEIGHT: 64 IN | BODY MASS INDEX: 23.52 KG/M2 | SYSTOLIC BLOOD PRESSURE: 109 MMHG | WEIGHT: 137.8 LBS

## 2024-01-25 DIAGNOSIS — Z12.39 BREAST CANCER SCREENING, HIGH RISK PATIENT: Primary | ICD-10-CM

## 2024-01-25 DIAGNOSIS — Z12.31 ENCOUNTER FOR SCREENING MAMMOGRAM FOR MALIGNANT NEOPLASM OF BREAST: ICD-10-CM

## 2024-01-25 DIAGNOSIS — R92.30 DENSE BREAST TISSUE: ICD-10-CM

## 2024-01-25 DIAGNOSIS — Z79.810 USE OF TAMOXIFEN (NOLVADEX): ICD-10-CM

## 2024-01-25 DIAGNOSIS — Z12.39 ENCOUNTER FOR OTHER SCREENING FOR MALIGNANT NEOPLASM OF BREAST: ICD-10-CM

## 2024-01-25 PROCEDURE — 77067 SCR MAMMO BI INCL CAD: CPT

## 2024-01-25 PROCEDURE — 99213 OFFICE O/P EST LOW 20 MIN: CPT | Performed by: NURSE PRACTITIONER

## 2024-01-25 PROCEDURE — 77067 SCR MAMMO BI INCL CAD: CPT | Mod: BILATERAL PROCEDURE | Performed by: STUDENT IN AN ORGANIZED HEALTH CARE EDUCATION/TRAINING PROGRAM

## 2024-01-25 PROCEDURE — 77063 BREAST TOMOSYNTHESIS BI: CPT | Mod: BILATERAL PROCEDURE | Performed by: STUDENT IN AN ORGANIZED HEALTH CARE EDUCATION/TRAINING PROGRAM

## 2024-01-25 ASSESSMENT — PAIN SCALES - GENERAL: PAINLEVEL: 0-NO PAIN

## 2024-01-25 NOTE — PATIENT INSTRUCTIONS
Your clinical examination and imaging are normal. Please return in one year for bilateral screening mammogram and office visit or sooner if you have any problems or concerns. Continue Tamoxifen 10mg daily.     High risk breast surveillance care plan:  Yearly mammogram with digital breast tomosynthesis  Twice yearly clinical breast examinations  Breast MRI - Full MRI in July 2024  Monthly self breast examinations  Vitamin D3 2000 IU/daily (over the counter)  Exercise 3-4 times per week for 45-60 minutes  Limit alcohol to 3-4 drinks per week   Eat a healthy low-fat diet with lots of fruits and vegetables  Risk models indicate personal risk of breast cancer in the next five years and lifetime (age 90)  Breast Cancer Risk Assessment Tool (Zaynab): 5 year risk 3.5% (average 1.3%) and lifetime risk 27.3% (average 11%)  Junior: 5 year risk 5.2% (average 1.3%) and lifetime risk 32.8% (average 9.4%)     You can see your health information, review clinical summaries from office visits & test results online when you follow your health with MY  Chart, a personal health record. To sign up go to [email or web address]. If you need assistance with signing up or trouble getting into your account call [employer] Patient Line 24/7 at [phone].    My office phone number is [phone] if you need to get in touch with me or have additional questions or concerns. Thank you for choosing [facility] and trusting me as your healthcare provider. I look forward to seeing you again at your next office visit. I am honored to be a provider on your health care team and I remain dedicated to helping you achieve your health goals.

## 2024-01-31 DIAGNOSIS — Z79.810 USE OF TAMOXIFEN (NOLVADEX): ICD-10-CM

## 2024-01-31 DIAGNOSIS — R92.30 DENSE BREAST TISSUE: ICD-10-CM

## 2024-04-24 ENCOUNTER — PATIENT MESSAGE (OUTPATIENT)
Dept: OBSTETRICS AND GYNECOLOGY | Facility: CLINIC | Age: 49
End: 2024-04-24

## 2024-04-24 ENCOUNTER — OFFICE VISIT (OUTPATIENT)
Dept: OBSTETRICS AND GYNECOLOGY | Facility: CLINIC | Age: 49
End: 2024-04-24
Payer: COMMERCIAL

## 2024-04-24 VITALS
WEIGHT: 137 LBS | SYSTOLIC BLOOD PRESSURE: 105 MMHG | HEIGHT: 64 IN | DIASTOLIC BLOOD PRESSURE: 68 MMHG | BODY MASS INDEX: 23.39 KG/M2

## 2024-04-24 DIAGNOSIS — N63.23 BREAST LUMP ON LEFT SIDE AT 4 O'CLOCK POSITION: ICD-10-CM

## 2024-04-24 DIAGNOSIS — Z01.419 WELL WOMAN EXAM WITH ROUTINE GYNECOLOGICAL EXAM: Primary | ICD-10-CM

## 2024-04-24 DIAGNOSIS — Z12.4 PAP SMEAR FOR CERVICAL CANCER SCREENING: ICD-10-CM

## 2024-04-24 PROCEDURE — 1036F TOBACCO NON-USER: CPT | Performed by: NURSE PRACTITIONER

## 2024-04-24 PROCEDURE — 87624 HPV HI-RISK TYP POOLED RSLT: CPT

## 2024-04-24 PROCEDURE — 99396 PREV VISIT EST AGE 40-64: CPT | Performed by: NURSE PRACTITIONER

## 2024-04-24 PROCEDURE — 88175 CYTOPATH C/V AUTO FLUID REDO: CPT

## 2024-04-24 NOTE — PROGRESS NOTES
HPI: Naheed Mcgee is a 49 y.o. year old  presenting for annual gyn exam  S/p left breast excisional biopsy 2023- ADH and ALH   FMH maternal cousin breast ca age 61yo (BRCA2 pos), and ovarian ca MGM age 39yo.   Pt started tamoxifen 10/2023, tolerating well. Sees Onc has mamm and MRIs yearly  Quit smoking last year    Current concerns: no concerns  , has daughter at [employer], who is a georgiana, and a son at [employer] graduating next month and will then go to [employer].    LMP:  No LMP recorded. Patient is perimenopausal.; Menopause at 47yo, No HRT  Abnormal bleeding: no  Hot flashes/night sweats: no  Sexually active: yes  Vaginal dryness: no   STI concerns: no  Last mammogram: 2023 nml, MRI 2023   Last pap:  normal, neg HPV  History of abnormal pap: no  Other gyn history: remote h/o right breast biopsy x2, benign; c/section   OB History    No obstetric history on file.      Past Medical History:  2020: Encounter for screening mammogram for malignant neoplasm   of breast      Comment:  Other screening mammogram   Past Surgical History:  2023: BI MAMMO GUIDED LOCALIZATION BREAST LEFT; Left      Comment:  BI MAMMO GUIDED LOCALIZATION BREAST LEFT [facility]  No date: BREAST BIOPSY; Bilateral  2019:  SECTION, CLASSIC      Comment:   Section  2018: OTHER SURGICAL HISTORY      Comment:  Bunion Correction With Metatarsal Osteotomy   Social History    Socioeconomic History      Marital status:       Spouse name: Not on file      Number of children: Not on file      Years of education: Not on file      Highest education level: Not on file    Occupational History      Not on file    Tobacco Use      Smoking status: Former        Packs/day: 0.00        Types: Cigarettes        Start date:         Quit date:         Years since quittin.3      Smokeless tobacco: Never    Substance and Sexual Activity      Alcohol use: Not Currently      Drug use: Never      Sexual  activity: Not on file    Other Topics      Concerns:        Not on file    Social History Narrative      Not on file    Social Determinants of Health  Financial Resource Strain: Low Risk  (1/6/2023)      Received from [facility]      Overall Financial Resource Strain (CARDIA)          Difficulty of Paying Living Expenses: Not very hard  Food Insecurity: No Food Insecurity (1/6/2023)      Received from [facility]      Hunger Vital Sign          Worried About Running Out of Food in the Last Year: Never true          Ran Out of Food in the Last Year: Never true  Transportation Needs: No Transportation Needs (1/6/2023)      Received from [facility]      PRAPARE - Transportation          Lack of Transportation (Medical): No          Lack of Transportation (Non-Medical): No  Physical Activity: Sufficiently Active (1/6/2023)      Received from [facility]      Exercise Vital Sign          Days of Exercise per Week: 5 days          Minutes of Exercise per Session: 40 min  Stress: Stress Concern Present (1/6/2023)      Received from [facility]      [employer] - Occupational Stress Questionnaire          Feeling of Stress : To some extent  Social Connections: Unknown (1/6/2023)      Received from [facility]      Social Connection and Isolation Panel [NHANES]          Frequency of Communication with Friends and Family: More than three times a week          Frequency of Social Gatherings with Friends and Family: More than three times a week          Attends Restoration Services: Patient declined          Active Member of Clubs or Organizations: Yes          Attends Club or Organization Meetings: More than 4 times per year          Marital Status:   Intimate Partner Violence: Not on file  Housing Stability: Low Risk  (1/6/2023)      Received from [facility]      Housing Stability Vital Sign          Unable to Pay for Housing in the Last Year: No           "Number of Places Lived in the Last Year: 1          Unstable Housing in the Last Year: No   Review of patient's family history indicates:  Problem: Ovarian cancer      Relation: Maternal Grandmother          Name:               Age of Onset: (Not Specified)  Problem: Breast cancer      Relation: Cousin          Name:               Age of Onset: 60              Comment: maternal 1st cousin     Current Outpatient Medications:   buPROPion SR (Wellbutrin SR) 150 mg 12 hr tablet, Take 1 tablet (150 mg) by mouth twice a day., Disp: , Rfl:   tamoxifen (Nolvadex) 10 mg tablet, FOR DIRECTIONS ON HOW TO   TAKE THIS MEDICATION, READ THE [employer] MAIL SERVICE  INVOICE/RECEIPT., Disp: 90 tablet, Rfl: 3          REVIEW OF SYSTEMS:  Breast. denies masses, nipple discharge  : denies dysuria, hematuria incontinence   Gl: denies change in bowel habits, blood in stools; h/o constipation, now getting Botox      PHYSICAL EXAM:  Vitals: /68 (BP Location: Left arm, Patient Position: Sitting)   Ht 1.626 m (5' 4\")   Wt 62.1 kg (137 lb)   BMI 23.52 kg/m²   Gen: well appearing woman in NAD  HEENT: normocephalic, thyroid not enlarged, no lymphadenopathy  CV: no m/r/g  Chest: CTAB, no w/r/r  Breast: normal tissue bilaterally with mass noted left breast at 4:00 1cmfn oval and mobile approx 1cm long, no skin changes or lymphadenopathy   Abdomen: soft, nontender, no masses/hernias, liver and spleen not enlarged   Pelvic:  - external genitalia: normal  - vagina: normal  - cervix: normal  - uterus: normal size, nontender  - adnexa: no palpable masses or tenderness    ASSESSMENT/PLAN :    1) Health maintenance, Well-woman exam.  Pap done with HPV.  Mammogram ordered left breast diagnostic  Nutrition, exercise and routine health maintenance exams reviewed.  Calcium/Vitamin D supplementation information provided   Smoking cessation: non-smoker, quit last year  2) Postmenopausal: no concerns  3) STD screening: declines, no concerns  4) Follow " up one year or sooner as needed

## 2024-04-25 NOTE — PROGRESS NOTES
Naheed Mcgee female   1975 49 y.o.   [id number]      Chief Complaint  High risk surveillance care, annual mammogram and exam    History Of Present Illness  Naheed (''Marylu'') Everardo is a very pleasant 49 year old  female who is status post left breast Magseed localized excisional biopsy on 2023 for screen detected left breast atypical intraductal proliferation. Final pathology yielded atypical ductal hyperplasia (ADH) and atypical lobular hyperplasia (ALH), radial scar and PASH. She has a remote history of two right breast biopsies in 2019, benign. She has family history of breast cancer in her maternal cousin, 60. She has family history of ovarian cancer in her maternal grandmother, age 40. She started Tamoxifen 10mg daily in 2023, currently taking and tolerating well. She presents today for left breast lump palpated by her GYN last week.     BREAST IMAGIN2024 Bilateral screening mammogram, indicates BI-RADS Category 2. 2023 Bilateral Full MRI, indicates BI-RADS Category 2.      FEMALE HISTORY: menarche age 8, , first birth age 29, did not breastfeed, OCP's x 30 years natural menopause age 48, heterogeneously dense tissue     FAMILY CANCER HISTORY:  Maternal Cousin: Breast cancer, 60, BRCA 2 positive  Maternal Grandmother: Ovarian cancer, 40  Father: Bladder cancer, 60     Surgical History  She has a past surgical history that includes Other surgical history (2018);  section, classic (2019); BI mammo guided breast left localization (Left, 2023); and Breast biopsy (Bilateral).     Social History  She reports that she quit smoking about 16 months ago. Her smoking use included cigarettes. She started smoking about 6 years ago. She has never used smokeless tobacco. She reports that she does not currently use alcohol. She reports that she does not use drugs.    Family History  Family History   Problem Relation Name Age of Onset    Ovarian cancer  Maternal Grandmother      Breast cancer Cousin  60        maternal 1st cousin        Allergies  Penicillins    Medications  Current Outpatient Medications   Medication Instructions    buPROPion SR (WELLBUTRIN SR) 150 mg, oral, 2 times daily    tamoxifen (Nolvadex) 10 mg tablet FOR DIRECTIONS ON HOW TO   TAKE THIS MEDICATION, READ THE [facility] MAIL SERVICE  INVOICE/RECEIPT.         REVIEW OF SYSTEMS    Constitutional:  Negative for appetite change, fatigue, fever and unexpected weight change.   HENT:  Negative for ear pain, hearing loss, nosebleeds, sore throat and trouble swallowing.    Eyes:  Negative for discharge, itching and visual disturbance.   Respiratory:  Negative for cough, chest tightness and shortness of breath.    Cardiovascular:  Negative for chest pain, palpitations and leg swelling.   Breast: as indicated in HPI  Gastrointestinal:  Negative for abdominal pain, constipation, diarrhea and nausea.   Endocrine: Negative for cold intolerance and heat intolerance.   Genitourinary:  Negative for dysuria, frequency, hematuria, pelvic pain and vaginal bleeding.   Musculoskeletal:  Negative for arthralgias, back pain, gait problem, joint swelling and myalgias.   Skin:  Negative for color change and rash.   Allergic/Immunologic: Negative for environmental allergies and food allergies.   Neurological:  Negative for dizziness, tremors, speech difficulty, weakness, numbness and headaches.   Hematological:  Does not bruise/bleed easily.   Psychiatric/Behavioral:  Negative for agitation, dysphoric mood and sleep disturbance. The patient is not nervous/anxious.         Past Medical History  She has a past medical history of Encounter for screening mammogram for malignant neoplasm of breast (08/26/2020).     Physical Exam  Patient is alert and oriented x3 and in a relaxed and appropriate mood. Her gait is steady and hand grasps are equal. Sclera is clear. The breasts are small and nearly symmetrical. The tissue is  dense without palpable abnormalities, discrete nodules or masses. The left breast has a well-healed excisional biopsy incision, inferior lateral quadrant with palpable 1.5 cm soft benign cyst at 4:00 just superior to incision. The skin and nipples appear normal. The right nipple is inverted. There is no cervical, supraclavicular or axillary lymphadenopathy. Heart rate and rhythm normal, S1 and S2 appreciated. The lungs are clear to auscultation bilaterally. Abdomen is soft and non-tender.       Physical Exam  Chest:              Last Recorded Vitals  Vitals:    05/02/24 1126   BP: 121/80   Pulse: 60   SpO2: 99%         Relevant Results   Time was spent viewing digital images of the radiology testing with the patient. I explained the results in depth, along with suggested explanation for follow up recommendations based on the testing results. BI-RADS Category 2    Imaging  Narrative & Impression   Interpreted By:  Javier Fox,   STUDY:  BI US BREAST LIMITED LEFT;  5/2/2024 11:28 am      ACCESSION NUMBER(S):  [id number]      ORDERING CLINICIAN:  JHONNY HENNING      INDICATION:  The patient presents for palpable lump in the left breast.      COMPARISON:  01/25/2024 and 11/15/2023      FINDINGS:  Targeted ultrasound of the left breast was performed by a registered  sonography with elastography.      At the 4 o'clock position in the subareolar region a benign anechoic  simple cyst is seen measuring 1.3 cm in length. The cyst is avascular  and soft on elastography. A surgical clip is seen adjacent to the  cyst.      IMPRESSION:  Benign cyst at the site of patient's palpable lump. Clinical  follow-up is recommended. Patient to return to annual screening.      BI-RADS CATEGORY:      BI-RADS Category:  2 Benign.  Recommendation:  Annual Screening.  Recommended Date:  8 Months.  Laterality:  Bilateral.       Narrative & Impression   Interpreted By:  Katie Hernandez,   STUDY:  BI MAMMO BILATERAL SCREENING  TOMOSYNTHESIS;  1/25/2024 1:56 pm      ACCESSION NUMBER(S):  [id number]      ORDERING CLINICIAN:  KIM MCLAIN      INDICATION:  Screening. History of left breast lumpectomy. Benign right breast  biopsy.      COMPARISON:  01/23/2023, 01/11/2022.      FINDINGS:  2D and tomosynthesis images were reviewed at 1 mm slice thickness.      Density:  The breast tissue is heterogeneously dense, which may  obscure small masses.      Postoperative scarring and surgical clips are seen in the central  lateral left breast at mid to posterior depth. Stable benign  calcifications are seen in the central slightly inferior right  breast. No suspicious masses or calcifications are identified.      IMPRESSION:  No mammographic evidence of malignancy. Posttreatment changes of the  left breast.      BI-RADS CATEGORY:      BI-RADS Category:  2 Benign.  Recommendation:  Routine Screening Mammogram in 1 Year.  Recommended Date:  1 Year.  Laterality:  Bilateral.       MR breast bilateral w contrast full protocol 07/12/2023    Narrative  Interpreted By:  NEHA MOSQUEDA MD  MRN: [id number]  Patient Name: TERRY SEGOVIA    STUDY:  MRI BREAST BILATERAL WITH CONTRAST FULL PROTOCOL;  7/12/2023 1:10 pm    ACCESSION NUMBER(S):  [id number]    ORDERING CLINICIAN:  KIM MCLAIN    INDICATION:  High risk screening. Dense breast tissue. Left breast excisional  biopsy of atypia.    COMPARISON:  Mammogram 03/20/2023    TECHNIQUE:  Using a dedicated breast coil, STIR axial and T1-weighted fat  saturation axial images of the breasts were obtained, the latter both  before and after intravenous administration of Gadolinium DTPA. On an  independent workstation, 3-D images were formulated using [employer]  including time enhancement curves, subtraction images and MIP images.    Intravenous contrast:  12 milliliter of DOTAREM GADOTERATE MEGLUMINE  INJECTION    FINDINGS:  There is  symmetric  marked bilateral background enhancement,  limiting the  evaluation.    There is extreme fibroglandular tissue. Bilateral cysts are present.    RIGHT BREAST:  No suspicious mass or nonmass enhancement is identified.    No axillary or internal mammary lymphadenopathy is appreciated.    LEFT BREAST:  No suspicious mass or nonmass enhancement is identified. Minimal  postsurgical changes are seen in the inferior central to lateral  aspect at posterior depth.    No axillary or internal mammary lymphadenopathy is appreciated.    NON-BREAST FINDINGS:  None. Please note that the evaluation of the cervical, thoracic and  abdominal structures is limited on the dedicated breast MRI  examination.    Impression  No MRI evidence of malignancy in either breast.    BI-RADS CATEGORY:    Category: 2 - Benign.  Recommendation: 1 Year Screening.       Assessment/Plan   High risk surveillance care, normal clinical exam and imaging, history of left ADH/ALH, history of right core biopsy, benign, left breast benign cyst, family history of breast and ovarian cancer, heterogeneously dense tissue     Plan: Return in January 2024 for bilateral screening mammogram and office visit. Full MRI in July 2024. Continue Tamoxifen 10mg daily. Reassured patient normal exam and imaging. The cyst was palpated at her visit in Jan 2024.     Patient Discussion/Summary  Your clinical examination and imaging are normal. Please return in January 2024 for bilateral screening mammogram and office visit or sooner if you have any problems or concerns. Continue Tamoxifen 10mg daily.     You can see your health information, review clinical summaries from office visits & test results online when you follow your health with MY  Chart, a personal health record. To sign up go to [email or web address]. If you need assistance with signing up or trouble getting into your account call [facility] Patient Line 24/7 at [phone].    My office phone number is [phone] if you need to get in touch with me or have additional  questions or concerns. Thank you for choosing [facility] and trusting me as your healthcare provider. I look forward to seeing you again at your next office visit. I am honored to be a provider on your health care team and I remain dedicated to helping you achieve your health goals.      Rylee Cary, APRN-CNP

## 2024-04-26 ENCOUNTER — APPOINTMENT (OUTPATIENT)
Dept: RADIOLOGY | Facility: CLINIC | Age: 49
End: 2024-04-26
Payer: COMMERCIAL

## 2024-04-29 ENCOUNTER — APPOINTMENT (OUTPATIENT)
Dept: UROLOGY | Facility: CLINIC | Age: 49
End: 2024-04-29
Payer: COMMERCIAL

## 2024-05-02 ENCOUNTER — HOSPITAL ENCOUNTER (OUTPATIENT)
Dept: RADIOLOGY | Facility: CLINIC | Age: 49
Discharge: HOME | End: 2024-05-02
Payer: COMMERCIAL

## 2024-05-02 ENCOUNTER — OFFICE VISIT (OUTPATIENT)
Dept: SURGICAL ONCOLOGY | Facility: CLINIC | Age: 49
End: 2024-05-02
Payer: COMMERCIAL

## 2024-05-02 VITALS
DIASTOLIC BLOOD PRESSURE: 80 MMHG | WEIGHT: 134.2 LBS | SYSTOLIC BLOOD PRESSURE: 121 MMHG | HEART RATE: 60 BPM | OXYGEN SATURATION: 99 % | BODY MASS INDEX: 23.04 KG/M2

## 2024-05-02 DIAGNOSIS — N63.23 BREAST LUMP ON LEFT SIDE AT 4 O'CLOCK POSITION: ICD-10-CM

## 2024-05-02 DIAGNOSIS — N63.23 MASS OF LOWER OUTER QUADRANT OF LEFT BREAST: Primary | ICD-10-CM

## 2024-05-02 LAB
CYTOLOGY CMNT CVX/VAG CYTO-IMP: NORMAL
HPV HR 12 DNA GENITAL QL NAA+PROBE: NEGATIVE
HPV HR GENOTYPES PNL CVX NAA+PROBE: NEGATIVE
HPV16 DNA SPEC QL NAA+PROBE: NEGATIVE
HPV18 DNA SPEC QL NAA+PROBE: NEGATIVE
LAB AP HPV GENOTYPE QUESTION: YES
LAB AP HPV HR: NORMAL
LABORATORY COMMENT REPORT: NORMAL
PATH REPORT.TOTAL CANCER: NORMAL

## 2024-05-02 PROCEDURE — 99213 OFFICE O/P EST LOW 20 MIN: CPT | Performed by: NURSE PRACTITIONER

## 2024-05-02 PROCEDURE — 76642 ULTRASOUND BREAST LIMITED: CPT | Mod: LT

## 2024-05-02 PROCEDURE — 76642 ULTRASOUND BREAST LIMITED: CPT | Mod: LEFT SIDE | Performed by: STUDENT IN AN ORGANIZED HEALTH CARE EDUCATION/TRAINING PROGRAM

## 2024-05-02 PROCEDURE — 76982 USE 1ST TARGET LESION: CPT | Mod: LT

## 2024-05-02 ASSESSMENT — PAIN SCALES - GENERAL: PAINLEVEL: 0-NO PAIN

## 2024-05-02 NOTE — PATIENT INSTRUCTIONS
Your clinical examination and imaging are normal. Please return in January 2024 for bilateral screening mammogram and office visit or sooner if you have any problems or concerns. Continue Tamoxifen 10mg daily.     You can see your health information, review clinical summaries from office visits & test results online when you follow your health with MY  Chart, a personal health record. To sign up go to [email or web address]. If you need assistance with signing up or trouble getting into your account call [employer] Patient Line 24/7 at [phone].    My office phone number is [phone] if you need to get in touch with me or have additional questions or concerns. Thank you for choosing [facility] and trusting me as your healthcare provider. I look forward to seeing you again at your next office visit. I am honored to be a provider on your health care team and I remain dedicated to helping you achieve your health goals.

## 2024-05-28 ENCOUNTER — OFFICE VISIT (OUTPATIENT)
Dept: UROLOGY | Facility: CLINIC | Age: 49
End: 2024-05-28
Payer: COMMERCIAL

## 2024-05-28 VITALS
BODY MASS INDEX: 23.43 KG/M2 | WEIGHT: 136.5 LBS | DIASTOLIC BLOOD PRESSURE: 52 MMHG | HEART RATE: 60 BPM | SYSTOLIC BLOOD PRESSURE: 108 MMHG

## 2024-05-28 DIAGNOSIS — M62.89 HIGH-TONE PELVIC FLOOR DYSFUNCTION: ICD-10-CM

## 2024-05-28 DIAGNOSIS — R35.0 URINARY FREQUENCY: Primary | ICD-10-CM

## 2024-05-28 DIAGNOSIS — R39.15 URGENCY OF URINATION: ICD-10-CM

## 2024-05-28 LAB
POC APPEARANCE, URINE: CLEAR
POC BILIRUBIN, URINE: NEGATIVE
POC BLOOD, URINE: ABNORMAL
POC COLOR, URINE: ABNORMAL
POC GLUCOSE, URINE: NEGATIVE MG/DL
POC KETONES, URINE: NEGATIVE MG/DL
POC LEUKOCYTES, URINE: NEGATIVE
POC NITRITE,URINE: NEGATIVE
POC PH, URINE: 7.5 PH
POC PROTEIN, URINE: NEGATIVE MG/DL
POC SPECIFIC GRAVITY, URINE: 1.02
POC UROBILINOGEN, URINE: 0.2 EU/DL

## 2024-05-28 PROCEDURE — 96372 THER/PROPH/DIAG INJ SC/IM: CPT | Performed by: OBSTETRICS & GYNECOLOGY

## 2024-05-28 PROCEDURE — 81003 URINALYSIS AUTO W/O SCOPE: CPT | Mod: 91 | Performed by: OBSTETRICS & GYNECOLOGY

## 2024-05-28 PROCEDURE — 52287 CYSTOSCOPY CHEMODENERVATION: CPT | Performed by: OBSTETRICS & GYNECOLOGY

## 2024-05-28 PROCEDURE — 2500000004 HC RX 250 GENERAL PHARMACY W/ HCPCS (ALT 636 FOR OP/ED): Mod: JZ | Performed by: OBSTETRICS & GYNECOLOGY

## 2024-05-28 PROCEDURE — 99213 OFFICE O/P EST LOW 20 MIN: CPT | Performed by: OBSTETRICS & GYNECOLOGY

## 2024-05-28 PROCEDURE — 2500000002 HC RX 250 W HCPCS SELF ADMINISTERED DRUGS (ALT 637 FOR MEDICARE OP, ALT 636 FOR OP/ED): Performed by: OBSTETRICS & GYNECOLOGY

## 2024-05-28 RX ORDER — NITROFURANTOIN 25; 75 MG/1; MG/1
100 CAPSULE ORAL ONCE
Status: COMPLETED | OUTPATIENT
Start: 2024-05-28 | End: 2024-05-28

## 2024-05-28 RX ORDER — NITROFURANTOIN 25; 75 MG/1; MG/1
100 CAPSULE ORAL 2 TIMES DAILY
Qty: 6 CAPSULE | Refills: 0 | Status: SHIPPED | OUTPATIENT
Start: 2024-05-28 | End: 2024-05-31

## 2024-05-28 RX ADMIN — NITROFURANTOIN (MONOHYDRATE/MACROCRYSTALS) 100 MG: 75; 25 CAPSULE ORAL at 15:52

## 2024-05-28 RX ADMIN — ONABOTULINUMTOXINA 100 UNITS: 100 INJECTION, POWDER, LYOPHILIZED, FOR SOLUTION INTRADERMAL; INTRAMUSCULAR at 15:53

## 2024-05-28 NOTE — PROGRESS NOTES
Subjective   Patient ID: Naheed Mcgee is a 49 y.o. female who presents for intradetrusor Botox.  HPI  49-year-old with urinary urgency and frequency and high tone pelvic floor weakness having undergone 100 units of Botox 06/26/2023 , 12/5/2023 and today 5/28/2024.     She has no other complaints.     From Previous note  48-year-old with urinary urgency and frequency and high tone pelvic floor weakness having undergone 100 units of Botox 06/26/2023 presenting for repeat 100 units Botox.     She has noted worsening urinary urgency and frequency complaints and believes it is time for repeat 100 units Botox.    She has no other complaints.     From Previous note     48-year-old with urinary urgency and frequency and high tone pelvic floor weakness presenting for 100 units of Botox 06/26/2023.     She reports her Botox injections have helped her greatly. She is happy with her treatment. She notes she felt pressure/cramping 2 days after her injection but has since resolved.     She has no other complaints.      From previous note  48-year-old with urinary urgency and frequency and high tone pelvic floor weakness presenting for 100 units of Botox today 06/26/2023.      She has no other complaints.      From previous note  48- year-old patient presenting as a self referral with complaints of urinary urgency, frequency and incontinence complaints.     The patient presents with complaints or urinary frequency and urgency. She 2-3 notes episodes of nocturia but denies any enuresis. She voids 1-2 times every hour during the day. She has previously tried timed voiding and pelvic floor Physical therapy with no benefits. She has utilized Ditropan in the past with no benefits. She was also prescribed Myrbetriq and Gemtesa which have not benefited her lower urinary tract complaints. She denies leaking on laughing, cough or sneezing. She denies any history of nephrolithiasis, gross hematuria or chronic recurrent UTIs.      She  underwent urodynamic testing 9/18/2020 demonstrating a hypersensitive bladder with urgency and no other concerns.     She is sexually active but denies any vaginal complaints, no abnormal vaginal bleeding or discharge. She denies any vaginal dryness or irritation. She denies any bulge complaints, no pressure or pulling.     She denies any bowel related complaints, no fecal or flatal incontinence.     She has no other complaints.          Review of Systems  Constitutional: No fever, No chills and No fatigue.   Eyes: No vision problems and No dryness of the eyes.   ENT: No dry mouth, No hearing loss and No nosebleeds.   Cardiovascular: No chest pain, No palpitations and No orthopnea.   Respiratory: No shortness of breath, No cough and No wheezing.   Gastrointestinal: No abdominal pain, No constipation, No nausea, No diarrhea, No vomiting and No melena.   Genitourinary: As noted in HPI.   Musculoskeletal: No back pain, No myalgias, No muscle weakness, No joint swelling and No leg edema.   Integumentary: No rashes, No skin lesion and No itching.   Neurological: No headache, No numbness and No dizziness.   Psychiatric: No sleep disturbances, No anxiety and No depression.   Endocrine: No hot flashes, No loss of hair and No hirsutism.   Hematologic/Lymphatic: No swollen glands, No tendency for easy bleeding and No tendency for easy bruising.   All other systems have been reviewed and are negative for complaint.        Objective   Physical Exam    PHYSICAL EXAMINATION:  No LMP recorded. Patient is perimenopausal.  Body mass index is 23.43 kg/m².  /52   Pulse 60   Wt 61.9 kg (136 lb 8 oz)   BMI 23.43 kg/m²   General Appearance: well appearing  Neuro: Alert and oriented   HEENT: mucous membranes moist, neck supple  Resp: No respiratory distress, normal work of breathing  MSK: normal range of motion, gait appropriate      After consent was signed and placed in the chart the patient was prepped with iodine and  lidocaine.  A flexible cystoscope was then introduced into the bladder with normal-appearing bladder mucosa and ureteral orifices in the normal orthotopic position.  She was then injected with in 5 locations with an Olympus needle to a depth of 4 mm with 2 cc aliquots of a mixture of 100 units Botox mixed into 10 cc of preservative-free saline.  The bladder was then drained.  There was no bleeding.  The patient received 100 mg of Macrobid at the completion of the case.  She tolerated the procedure well.    Assessment/Plan   48-year-old with urinary urgency and frequency and high tone pelvic floor weakness having undergone 100 units of Botox 06/26/2023 , 12/5/2023 and today 5/28/2024.     #1 uncomplicated 100 units Botox today 5/28/2024.  The patient has previously utilized Ditropan as well as Myrbetriq and Gemtesa with no significant benefits to her lower urinary tract symptoms. Urodynamics performed in September 2020 demonstrated hypersensitivity with no other bladder abnormalities and no genuine stress urinary incontinence. We have previously discussed at length her third line therapeutic options including PTNS, Botox, and InterStim. She has had excellent benefits with her 100 units Botox and we will continue this moving forward.  The patient will receive a 3-day course of Macrobid therapy now.     2. The patient was noted to have a high tone but weak pelvic floor.  She is not interested in this at this time.     3. The patient will follow-up for repeat 100 units Botox in 5-6 months.     JASON Kong MD     Scribe Attestation  By signing my name below, I, Nishant Kong MD, Scribe attest that this documentation has been prepared under the direction and in the presence of Nishant Kong MD. All medical record entries made by the Scribe were at my direction or personally dictated by me. I have reviewed the chart and agree that the record accurately reflects my personal performance of the history,  physical exam, discussion and plan.

## 2024-05-28 NOTE — PATIENT INSTRUCTIONS
Please  your 3-day course of Macrobid therapy at your local pharmacy.    Please follow-up in 5 to 6 months for repeat 100 units Botox.    Please contact the clinic with any questions or concerns.    [phone]

## 2024-07-17 ENCOUNTER — HOSPITAL ENCOUNTER (OUTPATIENT)
Dept: RADIOLOGY | Facility: HOSPITAL | Age: 49
Discharge: HOME | End: 2024-07-17
Payer: COMMERCIAL

## 2024-07-17 DIAGNOSIS — R92.30 DENSE BREAST TISSUE: ICD-10-CM

## 2024-07-17 PROCEDURE — 77049 MRI BREAST C-+ W/CAD BI: CPT

## 2024-07-17 PROCEDURE — A9575 INJ GADOTERATE MEGLUMI 0.1ML: HCPCS | Performed by: NURSE PRACTITIONER

## 2024-07-17 PROCEDURE — 77049 MRI BREAST C-+ W/CAD BI: CPT | Performed by: RADIOLOGY

## 2024-07-17 PROCEDURE — 2550000001 HC RX 255 CONTRASTS: Performed by: NURSE PRACTITIONER

## 2024-07-17 RX ORDER — GADOTERATE MEGLUMINE 376.9 MG/ML
12 INJECTION INTRAVENOUS
Status: COMPLETED | OUTPATIENT
Start: 2024-07-17 | End: 2024-07-17

## 2024-10-01 DIAGNOSIS — Z12.39 BREAST CANCER SCREENING, HIGH RISK PATIENT: ICD-10-CM

## 2024-10-01 RX ORDER — TAMOXIFEN CITRATE 10 MG/1
TABLET ORAL
Qty: 90 TABLET | Refills: 3 | Status: SHIPPED | OUTPATIENT
Start: 2024-10-01

## 2025-01-08 NOTE — PROGRESS NOTES
Naheed Mcgee female   1975 50 y.o.   [id number]      Chief Complaint  High risk surveillance care, annual mammogram and exam    History Of Present Illness  Naheed (''Marylu'') Everardo is a very pleasant 50 year old  female who is status post left breast Magseed localized excisional biopsy on 2023 for screen detected left breast atypical intraductal proliferation. Final pathology yielded atypical ductal hyperplasia (ADH) and atypical lobular hyperplasia (ALH), radial scar and PASH. She has a remote history of two right breast biopsies in 2019, benign. She has family history of breast cancer in her maternal cousin, 60. She has family history of ovarian cancer in her maternal grandmother, age 40. She started Tamoxifen 10mg daily in 2023, currently taking and tolerating well. She presents today for annual mammogram and exam. She denies any new masses or lumps.     BREAST IMAGIN2024 Bilateral screening mammogram, indicates BI-RADS Category 2. 2024 Bilateral Full MRI, indicates BI-RADS Category 2.      FEMALE HISTORY: menarche age 8, , first birth age 29, did not breastfeed, OCP's x 30 years natural menopause age 48, heterogeneously dense tissue     FAMILY CANCER HISTORY:  Maternal Cousin: Breast cancer, 60, BRCA 2 positive  Maternal Grandmother: Ovarian cancer, 40  Father: Bladder cancer, 60     Surgical History  She has a past surgical history that includes Other surgical history (2018);  section, classic (2019); BI mammo guided breast left localization (Left, 2023); and Breast biopsy (Bilateral).     Social History  She reports that she quit smoking about 2 years ago. Her smoking use included cigarettes. She started smoking about 7 years ago. She has never used smokeless tobacco. She reports that she does not currently use alcohol. She reports that she does not use drugs.    Family History  Family History   Problem Relation Name Age of Onset     Ovarian cancer Maternal Grandmother      Breast cancer Cousin  60        maternal 1st cousin        Allergies  Penicillins    Medications  Current Outpatient Medications   Medication Instructions    buPROPion SR (WELLBUTRIN SR) 150 mg, 2 times daily    tamoxifen (Nolvadex) 10 mg tablet TAKE 1 TABLET DAILY WITH WATER OR ANY OTHER NONALCOHOLIC DRINK WITH OR WITHOUT FOOD AT AROUND THE SAME TIME EVERY DAY.         REVIEW OF SYSTEMS    Constitutional:  Negative for appetite change, fatigue, fever and unexpected weight change.   HENT:  Negative for ear pain, hearing loss, nosebleeds, sore throat and trouble swallowing.    Eyes:  Negative for discharge, itching and visual disturbance.   Respiratory:  Negative for cough, chest tightness and shortness of breath.    Cardiovascular:  Negative for chest pain, palpitations and leg swelling.   Breast: as indicated in HPI  Gastrointestinal:  Negative for abdominal pain, constipation, diarrhea and nausea.   Endocrine: Negative for cold intolerance and heat intolerance.   Genitourinary:  Negative for dysuria, frequency, hematuria, pelvic pain and vaginal bleeding.   Musculoskeletal:  Negative for arthralgias, back pain, gait problem, joint swelling and myalgias.   Skin:  Negative for color change and rash.   Allergic/Immunologic: Negative for environmental allergies and food allergies.   Neurological:  Negative for dizziness, tremors, speech difficulty, weakness, numbness and headaches.   Hematological:  Does not bruise/bleed easily.   Psychiatric/Behavioral:  Negative for agitation, dysphoric mood and sleep disturbance. The patient is not nervous/anxious.         Past Medical History  She has a past medical history of Breast cancer (Multi) and Encounter for screening mammogram for malignant neoplasm of breast (08/26/2020).     Physical Exam  Patient is alert and oriented x3 and in a relaxed and appropriate mood. Her gait is steady and hand grasps are equal. Sclera is clear. The  breasts are small and nearly symmetrical. The tissue is dense without palpable abnormalities, discrete nodules or masses. The left breast has a well-healed excisional biopsy incision, inferior lateral quadrant with palpable 1.5 cm soft benign cyst at 4:00 just superior to incision. The skin and nipples appear normal. The right nipple is inverted. There is no cervical, supraclavicular or axillary lymphadenopathy. Heart rate and rhythm normal, S1 and S2 appreciated. The lungs are clear to auscultation bilaterally. Abdomen is soft and non-tender.       Physical Exam  Chest:              Last Recorded Vitals  Vitals:    01/27/25 1335   BP: 113/70   Pulse: 59   Resp: 16   Temp: 36.6 °C (97.9 °F)           Relevant Results   Time was spent discussing digital images of the radiology testing with the patient, results pending    Risk Profile      Assessment/Plan   High risk surveillance care, normal clinical exam, screening mammogram, history of left ADH/ALH, history of right core biopsy, benign, left breast benign cyst, family history of breast and ovarian cancer, heterogeneously dense tissue     Plan: Return in one year for bilateral screening mammogram and office visit. Full MRI in July 2025. Continue Tamoxifen 10mg daily.     Patient Discussion/Summary  Your clinical examination is normal. Please return in one year for bilateral screening mammogram and office visit or sooner if you have any problems or concerns. Continue Tamoxifen 10mg daily.     High risk breast surveillance care plan:  Yearly mammogram with digital breast tomosynthesis  Twice yearly clinical breast examinations  Breast MRI - Full MRI in July 2025  Monthly self breast examinations  Vitamin D3 2000 IU/daily (over the counter)  Exercise 3-4 times per week for 45-60 minutes  Limit alcohol to 3-4 drinks per week   Eat a healthy low-fat diet with lots of fruits and vegetables    You can see your health information, review clinical summaries from office  visits & test results online when you follow your health with MY  Chart, a personal health record. To sign up go to [email or web address]. If you need assistance with signing up or trouble getting into your account call [employer] Patient Line 24/7 at [phone].    My office phone number is [phone] if you need to get in touch with me or have additional questions or concerns. Thank you for choosing [facility] and trusting me as your healthcare provider. I look forward to seeing you again at your next office visit. I am honored to be a provider on your health care team and I remain dedicated to helping you achieve your health goals.      Rylee Cary, APRN-CNP

## 2025-01-27 ENCOUNTER — HOSPITAL ENCOUNTER (OUTPATIENT)
Dept: RADIOLOGY | Facility: CLINIC | Age: 50
Discharge: HOME | End: 2025-01-27
Payer: COMMERCIAL

## 2025-01-27 ENCOUNTER — OFFICE VISIT (OUTPATIENT)
Dept: SURGICAL ONCOLOGY | Facility: CLINIC | Age: 50
End: 2025-01-27
Payer: COMMERCIAL

## 2025-01-27 VITALS
RESPIRATION RATE: 16 BRPM | DIASTOLIC BLOOD PRESSURE: 70 MMHG | SYSTOLIC BLOOD PRESSURE: 113 MMHG | HEART RATE: 59 BPM | BODY MASS INDEX: 24.37 KG/M2 | WEIGHT: 141.98 LBS | TEMPERATURE: 97.9 F

## 2025-01-27 DIAGNOSIS — R92.30 DENSE BREAST TISSUE: Primary | ICD-10-CM

## 2025-01-27 DIAGNOSIS — Z79.810 USE OF TAMOXIFEN (NOLVADEX): ICD-10-CM

## 2025-01-27 DIAGNOSIS — Z12.39 BREAST CANCER SCREENING, HIGH RISK PATIENT: ICD-10-CM

## 2025-01-27 PROCEDURE — 77067 SCR MAMMO BI INCL CAD: CPT | Performed by: STUDENT IN AN ORGANIZED HEALTH CARE EDUCATION/TRAINING PROGRAM

## 2025-01-27 PROCEDURE — 77067 SCR MAMMO BI INCL CAD: CPT

## 2025-01-27 PROCEDURE — 99214 OFFICE O/P EST MOD 30 MIN: CPT | Performed by: NURSE PRACTITIONER

## 2025-01-27 PROCEDURE — 77063 BREAST TOMOSYNTHESIS BI: CPT | Performed by: STUDENT IN AN ORGANIZED HEALTH CARE EDUCATION/TRAINING PROGRAM

## 2025-01-27 PROCEDURE — 1036F TOBACCO NON-USER: CPT | Performed by: NURSE PRACTITIONER

## 2025-01-27 ASSESSMENT — ENCOUNTER SYMPTOMS
DEPRESSION: 0
LOSS OF SENSATION IN FEET: 0
OCCASIONAL FEELINGS OF UNSTEADINESS: 0

## 2025-01-27 ASSESSMENT — PAIN SCALES - GENERAL: PAINLEVEL_OUTOF10: 0-NO PAIN

## 2025-01-27 NOTE — PATIENT INSTRUCTIONS
Herminia Connelly is a 27year old female [id number] Patient's last menstrual period was 10/01/2020 (approximate). Patient presents with:  Gyn Exam: ANNUAL EXAM  Medication Request: Nuva Ring rx  Last annual and pap was last year. Pap was normal, HPV negative. Your clinical examination is normal. Please return in one year for bilateral screening mammogram and office visit or sooner if you have any problems or concerns. Continue Tamoxifen 10mg daily.     High risk breast surveillance care plan:  Yearly mammogram with digital breast tomosynthesis  Twice yearly clinical breast examinations  Breast MRI - Full MRI in July 2025  Monthly self breast examinations  Vitamin D3 2000 IU/daily (over the counter)  Exercise 3-4 times per week for 45-60 minutes  Limit alcohol to 3-4 drinks per week   Eat a healthy low-fat diet with lots of fruits and vegetables    You can see your health information, review clinical summaries from office visits & test results online when you follow your health with MY  Chart, a personal health record. To sign up go to [email or web address]. If you need assistance with signing up or trouble getting into your account call [facility] Patient Line 24/7 at [phone].    My office phone number is [phone] if you need to get in touch with me or have additional questions or concerns. Thank you for choosing [facility] and trusting me as your healthcare provider. I look forward to seeing you again at your next office visit. I am honored to be a provider on your health care team and I remain dedicated to helping you achieve your health goals.   phone: Not on file        Gets together: Not on file        Attends Congregational service: Not on file        Active member of club or organization: Not on file        Attends meetings of clubs or organizations: Not on file        Relationship status: Not on f every 6 (six) hours as needed. , Disp: 30 tablet, Rfl: 0  •  Prenatal Vit-Fe Fumarate-FA (SM PRENATAL VITAMINS) 28-0.8 MG Oral Tab, Take by mouth., Disp: , Rfl:     ALLERGIES:  No Known Allergies      Review of Systems:  Constitutional:  Denies fatigue, nig motion  Uterus: normal in size, contour, position, mobility, without tenderness  Adnexa: normal without masses or tenderness  Perineum: normal  Anus: no hemorroids     Assessment & Plan:  Selina Morgan was seen today for gyn exam and medication request.    Kacey Eugene

## 2025-03-23 NOTE — PROGRESS NOTES
Urogynecology  Provider:  Lisa Martin MD  [phone]              ASSESSMENT AND PLAN:     Problem List Items Addressed This Visit    None          I spent a total of *** minutes in face to face and non face to face time.      Lisa Martin MD              HISTORY OF PRESENT ILLNESS:     Sent in consultation by Dr. Kong for cysto Botox     23 Cysto Botox 100 U  24 Cysto Botox 100 U    he patient has previously utilized Ditropan as well as Myrbetriq and Gemtesa with no significant benefits to her lower urinary tract symptoms. Urodynamics performed in 2020 demonstrated hypersensitivity with no other bladder abnormalities and no genuine stress urinary incontinence.     Record Review:     Prolapse Symptoms :     Urinary Symptoms:     Bowel Symptoms:     Sexual Activity:          Past Medical History:       Past Medical History:   Diagnosis Date    Breast cancer (Multi)     Encounter for screening mammogram for malignant neoplasm of breast 2020    Other screening mammogram          Past Surgical History:       Past Surgical History:   Procedure Laterality Date    BI MAMMO GUIDED BREAST LEFT LOCALIZATION Left 2023    BI MAMMO GUIDED LOCALIZATION BREAST LEFT A[facility] IRINA    BREAST BIOPSY Bilateral      SECTION, CLASSIC  2019     Section    OTHER SURGICAL HISTORY  2018    Bunion Correction With Metatarsal Osteotomy         Medications:       Prior to Admission medications    Medication Sig Start Date End Date Taking? Authorizing Provider   buPROPion SR (Wellbutrin SR) 150 mg 12 hr tablet Take 1 tablet (150 mg) by mouth twice a day. 23   Historical Provider, MD   tamoxifen (Nolvadex) 10 mg tablet TAKE 1 TABLET DAILY WITH WATER OR ANY OTHER NONALCOHOLIC DRINK WITH OR WITHOUT FOOD AT AROUND THE SAME TIME EVERY DAY. 10/1/24   EDUARDO Felix-CNP        ROS  Review of Systems       PHYSICAL EXAM:      LMP 2020      Patient's last  menstrual period was 2020.      Declines chaperone for physical exam.    PVR=     Well developed, well nourished, in no apparent distress.   Neurologic/Psychiatric:  Awake, Alert and Oriented times 3.  Affect normal. Normal cranial nerves  Pulm: breathing without effort  Sexual maturity: Thierno stage V  Abd exam: soft, non-tender      GENITAL/URINARY:       External Genitalia:  The patient has normal appearing external genitalia, normal skenes and bartholins glands, and a normal hair distribution.  Her vulva is without lesions, erythema or discharge.  It is non-tender with appropriate sensation.     Urethral Meatus:  Size normal, Location normal, Lesions absent, Prolapse absent,      Urethra:  Fullness absent, Masses absent,      Bladder:  Fullness absent, Masses absent, Tenderness absent,      Vagina:  General appearance normal, Estrogen effect normal, Discharge absent, Lesions absent, ***     Cervix: Normal, no discharge.   Uterus:  {UTERUS, EXAM:74671}  Adnexa:  {exam; adnexa:40591}    Anus/Perineum:  Lesions absent and Masses absent {Exam; anus:71966}  {Exam; anus and perineum:88372}    Stress urinary incontinence *** demonstrable.       POP-Q  The patient has Stage *** Prolapse.    POP-Q:  Stage: {NUMBERS 0-4:34578}  Position: {SITTING STANDIN}    Aa: ***       Ba: *** C: ***   Gh: *** Pb: *** TVL: ***         Ap: *** Bp: *** D: ***             The patient has {0-5 daysi:18979} out of 5 pelvic floor muscle strength.    She {DOES/ DOES NOT:23223} have myofascial tenderness on exam.   Her highest pain score on exam is {NUMBERS 1-10:80875}        She has {0-5 daysi:27139} resting anal sphincter and *** squeeze tone.    Rectal exam: ***.     Assessment and Plan:        Lisa Martin MD

## 2025-03-24 ENCOUNTER — TELEMEDICINE (OUTPATIENT)
Dept: OBSTETRICS AND GYNECOLOGY | Facility: CLINIC | Age: 50
End: 2025-03-24
Payer: COMMERCIAL

## 2025-03-24 ENCOUNTER — APPOINTMENT (OUTPATIENT)
Dept: OBSTETRICS AND GYNECOLOGY | Facility: CLINIC | Age: 50
End: 2025-03-24
Payer: COMMERCIAL

## 2025-03-24 DIAGNOSIS — N32.81 OAB (OVERACTIVE BLADDER): Primary | ICD-10-CM

## 2025-03-24 DIAGNOSIS — N32.81 OVERACTIVE BLADDER: Primary | ICD-10-CM

## 2025-03-24 NOTE — PROGRESS NOTES
Urogynecology  Provider:  Lisa Martin MD  [phone]              ASSESSMENT AND PLAN:       Pt has had a recurrence of her symptoms over the last few months but hs had trouble getting in to see me.  We will try to expedite her Botox approval and get her scheduled for cysto/Botox in the office.    She was approved today and scheduled for cystp/Botox on 3/31/25. Plan 100 U.    Lisa Martin MD    Problem List Items Addressed This Visit    None          I spent a total of 10 minutes in face to face and non face to face time at this virtual visit.      Former patient of Dr. Kong for Botox for [place]. Last injection 5/24. Has had symptom recurrence for several months and wants reinjection.      Lisa Martin MD    Virtual or Telephone Consent    Virtual or Telephone Consent    While technically available, the patient was unable or unwilling to consent to connect via audio/video telehealth technology; therefore, I performed this visit using a real-time audio only connection between Naheed Mcgee & Lisa Martin MD.  Verbal consent was requested and obtained from Naheed Mcgee on this date, 03/24/25 for a telehealth visit and the patient's location was confirmed at the time of the visit.     HISTORY OF PRESENT ILLNESS:     48-year-old with urinary urgency and frequency and high tone pelvic floor weakness having undergone 100 units of Botox 06/26/2023 , 12/5/2023 and today 5/28/2024.     #1 uncomplicated 100 units Botox today 5/28/2024.  The patient has previously utilized Ditropan as well as Myrbetriq and Gemtesa with no significant benefits to her lower urinary tract symptoms. Urodynamics performed in September 2020 demonstrated hypersensitivity with no other bladder abnormalities and no genuine stress urinary incontinence.      Record Review:     Prolapse Symptoms :     Urinary Symptoms:     Bowel Symptoms:     Sexual Activity:          Past Medical History:       Past Medical History:  "  Diagnosis Date    Breast cancer (Multi)     Encounter for screening mammogram for malignant neoplasm of breast 2020    Other screening mammogram          Past Surgical History:       Past Surgical History:   Procedure Laterality Date    BI MAMMO GUIDED BREAST LEFT LOCALIZATION Left 2023    BI MAMMO GUIDED LOCALIZATION BREAST LEFT [facility]INA    BREAST BIOPSY Bilateral      SECTION, CLASSIC  2019     Section    OTHER SURGICAL HISTORY  2018    Bunion Correction With Metatarsal Osteotomy         Medications:       Prior to Admission medications    Medication Sig Start Date End Date Taking? Authorizing Provider   buPROPion SR (Wellbutrin SR) 150 mg 12 hr tablet Take 1 tablet (150 mg) by mouth twice a day. 23   Historical Provider, MD   tamoxifen (Nolvadex) 10 mg tablet TAKE 1 TABLET DAILY WITH WATER OR ANY OTHER NONALCOHOLIC DRINK WITH OR WITHOUT FOOD AT AROUND THE SAME TIME EVERY DAY. 10/1/24   Rylee Cary, APRN-CNP        ROS  Review of Systems       Data and DIAGNOSTIC STUDIES REVIEWED   No results found.   No results found for: \"URINECULTURE\", \"UAMICCOMM\"   No results found for: \"GLUCOSE\", \"CALCIUM\", \"NA\", \"K\", \"CO2\", \"CL\", \"BUN\", \"CREATININE\"No results found for: \"WBC\", \"HGB\", \"HCT\", \"MCV\", \"PLT\"         "

## 2025-03-31 ENCOUNTER — APPOINTMENT (OUTPATIENT)
Dept: OBSTETRICS AND GYNECOLOGY | Facility: CLINIC | Age: 50
End: 2025-03-31
Payer: COMMERCIAL

## 2025-03-31 ENCOUNTER — PROCEDURE VISIT (OUTPATIENT)
Dept: OBSTETRICS AND GYNECOLOGY | Facility: CLINIC | Age: 50
End: 2025-03-31
Payer: COMMERCIAL

## 2025-03-31 DIAGNOSIS — N32.81 OAB (OVERACTIVE BLADDER): Primary | ICD-10-CM

## 2025-03-31 DIAGNOSIS — N32.81 OVERACTIVE BLADDER: ICD-10-CM

## 2025-03-31 LAB
POC APPEARANCE, URINE: CLEAR
POC BILIRUBIN, URINE: NEGATIVE
POC BLOOD, URINE: ABNORMAL
POC COLOR, URINE: YELLOW
POC GLUCOSE, URINE: NEGATIVE MG/DL
POC KETONES, URINE: NEGATIVE MG/DL
POC LEUKOCYTES, URINE: NEGATIVE
POC NITRITE,URINE: NEGATIVE
POC PH, URINE: 7 PH
POC PROTEIN, URINE: NEGATIVE MG/DL
POC SPECIFIC GRAVITY, URINE: 1.01
POC UROBILINOGEN, URINE: 0.2 EU/DL

## 2025-03-31 PROCEDURE — 99213 OFFICE O/P EST LOW 20 MIN: CPT | Performed by: OBSTETRICS & GYNECOLOGY

## 2025-03-31 PROCEDURE — 52287 CYSTOSCOPY CHEMODENERVATION: CPT | Performed by: OBSTETRICS & GYNECOLOGY

## 2025-03-31 PROCEDURE — 2500000005 HC RX 250 GENERAL PHARMACY W/O HCPCS: Performed by: OBSTETRICS & GYNECOLOGY

## 2025-03-31 PROCEDURE — 2500000004 HC RX 250 GENERAL PHARMACY W/ HCPCS (ALT 636 FOR OP/ED): Mod: JZ | Performed by: OBSTETRICS & GYNECOLOGY

## 2025-03-31 PROCEDURE — 99213 OFFICE O/P EST LOW 20 MIN: CPT | Mod: 25 | Performed by: OBSTETRICS & GYNECOLOGY

## 2025-03-31 PROCEDURE — 2500000002 HC RX 250 W HCPCS SELF ADMINISTERED DRUGS (ALT 637 FOR MEDICARE OP, ALT 636 FOR OP/ED): Performed by: OBSTETRICS & GYNECOLOGY

## 2025-03-31 PROCEDURE — 81003 URINALYSIS AUTO W/O SCOPE: CPT | Mod: QW | Performed by: OBSTETRICS & GYNECOLOGY

## 2025-03-31 RX ORDER — LIDOCAINE HYDROCHLORIDE 10 MG/ML
5 INJECTION, SOLUTION INFILTRATION; PERINEURAL ONCE
Status: COMPLETED | OUTPATIENT
Start: 2025-03-31 | End: 2025-03-31

## 2025-03-31 RX ORDER — PHENAZOPYRIDINE HYDROCHLORIDE 200 MG/1
200 TABLET, FILM COATED ORAL ONCE
Status: COMPLETED | OUTPATIENT
Start: 2025-03-31 | End: 2025-03-31

## 2025-03-31 RX ORDER — SODIUM CHLORIDE 9 MG/ML
10 INJECTION, SOLUTION INTRAMUSCULAR; INTRAVENOUS; SUBCUTANEOUS ONCE
Status: COMPLETED | OUTPATIENT
Start: 2025-03-31 | End: 2025-03-31

## 2025-03-31 RX ORDER — NITROFURANTOIN 25; 75 MG/1; MG/1
100 CAPSULE ORAL ONCE
Status: COMPLETED | OUTPATIENT
Start: 2025-03-31 | End: 2025-03-31

## 2025-03-31 RX ORDER — LIDOCAINE HYDROCHLORIDE 20 MG/ML
1 JELLY TOPICAL ONCE
Status: COMPLETED | OUTPATIENT
Start: 2025-03-31 | End: 2025-03-31

## 2025-03-31 RX ORDER — NITROFURANTOIN 25; 75 MG/1; MG/1
100 CAPSULE ORAL 2 TIMES DAILY
Qty: 5 CAPSULE | Refills: 0 | Status: SHIPPED | OUTPATIENT
Start: 2025-03-31 | End: 2025-04-03

## 2025-03-31 RX ADMIN — LIDOCAINE HYDROCHLORIDE 1 APPLICATION: 20 JELLY TOPICAL at 15:20

## 2025-03-31 RX ADMIN — BISMUTH SUBSALICYLATE 200 MG: 262 TABLET, CHEWABLE ORAL at 15:05

## 2025-03-31 RX ADMIN — LIDOCAINE HYDROCHLORIDE 5 ML: 10 INJECTION, SOLUTION INFILTRATION; PERINEURAL at 15:20

## 2025-03-31 RX ADMIN — SODIUM CHLORIDE 10 ML: 9 INJECTION INTRAMUSCULAR; INTRAVENOUS; SUBCUTANEOUS at 15:20

## 2025-03-31 RX ADMIN — ONABOTULINUMTOXINA 100 UNITS: 100 INJECTION, POWDER, LYOPHILIZED, FOR SOLUTION INTRADERMAL; INTRAMUSCULAR at 15:48

## 2025-03-31 RX ADMIN — NITROFURANTOIN (MONOHYDRATE/MACROCRYSTALS) 100 MG: 75; 25 CAPSULE ORAL at 15:05

## 2025-03-31 NOTE — PROGRESS NOTES
HPI:  Pt here for intradetrusor Botox injection    Prior pt of Dilan. For Botox 100 U   100 units of Botox 06/26/2023 , 12/5/2023 and today 5/28/2024.     The patient gave a urine sample which was checked for infection and found to be negative.  Pt was numbed for 20 min with lidojet inserted in to the bladder and given 100 mg po pyridium.    The patient was consented for cytoscopic intradetrusor Botox injection.    100    Units was reconstituted in  10   mL of NS  Using a flexible scope injection was performed at 4 sites using the Laborie needle at a 3 mm depth starting in the midline just above the intraureteric ridge and then to the left and right of this site and then just above until the entire drug volume was injected followed by 1 mL NS flush.    Minimal bleeding was noted    The procedure was completed, the patient allowed to empty her bladder and get dressed.    Post-procedure precautions were given to the patient and macrobid 100 mg po BID x 3 days script sent.    She will follow-up for repeat injection in 6 months, scheduled today    STM

## 2025-05-08 ENCOUNTER — APPOINTMENT (OUTPATIENT)
Dept: OBSTETRICS AND GYNECOLOGY | Facility: CLINIC | Age: 50
End: 2025-05-08
Payer: COMMERCIAL

## 2025-07-30 ENCOUNTER — HOSPITAL ENCOUNTER (OUTPATIENT)
Dept: RADIOLOGY | Facility: HOSPITAL | Age: 50
Discharge: HOME | End: 2025-07-30
Payer: COMMERCIAL

## 2025-07-30 DIAGNOSIS — Z12.39 BREAST CANCER SCREENING, HIGH RISK PATIENT: ICD-10-CM

## 2025-07-30 DIAGNOSIS — R92.30 DENSE BREAST TISSUE: ICD-10-CM

## 2025-07-30 PROCEDURE — A9575 INJ GADOTERATE MEGLUMI 0.1ML: HCPCS | Mod: JW | Performed by: NURSE PRACTITIONER

## 2025-07-30 PROCEDURE — 2550000001 HC RX 255 CONTRASTS: Mod: JW | Performed by: NURSE PRACTITIONER

## 2025-07-30 PROCEDURE — 77049 MRI BREAST C-+ W/CAD BI: CPT

## 2025-07-30 PROCEDURE — 77049 MRI BREAST C-+ W/CAD BI: CPT | Performed by: RADIOLOGY

## 2025-07-30 RX ORDER — GADOTERATE MEGLUMINE 376.9 MG/ML
13 INJECTION INTRAVENOUS
Status: COMPLETED | OUTPATIENT
Start: 2025-07-30 | End: 2025-07-30

## 2025-07-30 RX ADMIN — GADOTERATE MEGLUMINE 13 ML: 376.9 INJECTION INTRAVENOUS at 08:03

## 2025-10-02 ENCOUNTER — APPOINTMENT (OUTPATIENT)
Dept: OBSTETRICS AND GYNECOLOGY | Facility: CLINIC | Age: 50
End: 2025-10-02
Payer: COMMERCIAL

## 2026-02-02 ENCOUNTER — APPOINTMENT (OUTPATIENT)
Dept: RADIOLOGY | Facility: CLINIC | Age: 51
End: 2026-02-02
Payer: COMMERCIAL

## 2026-02-02 ENCOUNTER — APPOINTMENT (OUTPATIENT)
Dept: SURGICAL ONCOLOGY | Facility: CLINIC | Age: 51
End: 2026-02-02
Payer: COMMERCIAL